# Patient Record
Sex: MALE | Race: OTHER | NOT HISPANIC OR LATINO | URBAN - METROPOLITAN AREA
[De-identification: names, ages, dates, MRNs, and addresses within clinical notes are randomized per-mention and may not be internally consistent; named-entity substitution may affect disease eponyms.]

---

## 2022-10-05 ENCOUNTER — INPATIENT (INPATIENT)
Facility: HOSPITAL | Age: 37
LOS: 11 days | Discharge: HOME | End: 2022-10-17
Attending: PSYCHIATRY & NEUROLOGY | Admitting: PSYCHIATRY & NEUROLOGY

## 2022-10-05 VITALS
RESPIRATION RATE: 17 BRPM | SYSTOLIC BLOOD PRESSURE: 121 MMHG | DIASTOLIC BLOOD PRESSURE: 61 MMHG | OXYGEN SATURATION: 100 % | HEART RATE: 64 BPM | TEMPERATURE: 98 F

## 2022-10-05 DIAGNOSIS — F29 UNSPECIFIED PSYCHOSIS NOT DUE TO A SUBSTANCE OR KNOWN PHYSIOLOGICAL CONDITION: ICD-10-CM

## 2022-10-05 LAB
ALBUMIN SERPL ELPH-MCNC: 4.6 G/DL — SIGNIFICANT CHANGE UP (ref 3.3–5)
ALP SERPL-CCNC: 54 U/L — SIGNIFICANT CHANGE UP (ref 40–120)
ALT FLD-CCNC: 20 U/L — SIGNIFICANT CHANGE UP (ref 4–41)
ANION GAP SERPL CALC-SCNC: 13 MMOL/L — SIGNIFICANT CHANGE UP (ref 7–14)
APAP SERPL-MCNC: <10 UG/ML — LOW (ref 15–25)
AST SERPL-CCNC: 30 U/L — SIGNIFICANT CHANGE UP (ref 4–40)
BASOPHILS # BLD AUTO: 0.06 K/UL — SIGNIFICANT CHANGE UP (ref 0–0.2)
BASOPHILS NFR BLD AUTO: 0.6 % — SIGNIFICANT CHANGE UP (ref 0–2)
BILIRUB SERPL-MCNC: 0.5 MG/DL — SIGNIFICANT CHANGE UP (ref 0.2–1.2)
BUN SERPL-MCNC: 15 MG/DL — SIGNIFICANT CHANGE UP (ref 7–23)
CALCIUM SERPL-MCNC: 9.4 MG/DL — SIGNIFICANT CHANGE UP (ref 8.4–10.5)
CHLORIDE SERPL-SCNC: 100 MMOL/L — SIGNIFICANT CHANGE UP (ref 98–107)
CK SERPL-CCNC: 350 U/L — HIGH (ref 30–200)
CO2 SERPL-SCNC: 27 MMOL/L — SIGNIFICANT CHANGE UP (ref 22–31)
CREAT SERPL-MCNC: 0.74 MG/DL — SIGNIFICANT CHANGE UP (ref 0.5–1.3)
EGFR: 121 ML/MIN/1.73M2 — SIGNIFICANT CHANGE UP
EOSINOPHIL # BLD AUTO: 0 K/UL — SIGNIFICANT CHANGE UP (ref 0–0.5)
EOSINOPHIL NFR BLD AUTO: 0 % — SIGNIFICANT CHANGE UP (ref 0–6)
ETHANOL SERPL-MCNC: <10 MG/DL — SIGNIFICANT CHANGE UP
FLUAV AG NPH QL: SIGNIFICANT CHANGE UP
FLUBV AG NPH QL: SIGNIFICANT CHANGE UP
GLUCOSE SERPL-MCNC: 95 MG/DL — SIGNIFICANT CHANGE UP (ref 70–99)
HCT VFR BLD CALC: 42 % — SIGNIFICANT CHANGE UP (ref 39–50)
HGB BLD-MCNC: 13.8 G/DL — SIGNIFICANT CHANGE UP (ref 13–17)
IANC: 6.77 K/UL — SIGNIFICANT CHANGE UP (ref 1.8–7.4)
IMM GRANULOCYTES NFR BLD AUTO: 0.4 % — SIGNIFICANT CHANGE UP (ref 0–0.9)
LYMPHOCYTES # BLD AUTO: 1.89 K/UL — SIGNIFICANT CHANGE UP (ref 1–3.3)
LYMPHOCYTES # BLD AUTO: 20.3 % — SIGNIFICANT CHANGE UP (ref 13–44)
MCHC RBC-ENTMCNC: 31.3 PG — SIGNIFICANT CHANGE UP (ref 27–34)
MCHC RBC-ENTMCNC: 32.9 GM/DL — SIGNIFICANT CHANGE UP (ref 32–36)
MCV RBC AUTO: 95.2 FL — SIGNIFICANT CHANGE UP (ref 80–100)
MONOCYTES # BLD AUTO: 0.55 K/UL — SIGNIFICANT CHANGE UP (ref 0–0.9)
MONOCYTES NFR BLD AUTO: 5.9 % — SIGNIFICANT CHANGE UP (ref 2–14)
NEUTROPHILS # BLD AUTO: 6.77 K/UL — SIGNIFICANT CHANGE UP (ref 1.8–7.4)
NEUTROPHILS NFR BLD AUTO: 72.8 % — SIGNIFICANT CHANGE UP (ref 43–77)
NRBC # BLD: 0 /100 WBCS — SIGNIFICANT CHANGE UP (ref 0–0)
NRBC # FLD: 0 K/UL — SIGNIFICANT CHANGE UP (ref 0–0)
PLATELET # BLD AUTO: 265 K/UL — SIGNIFICANT CHANGE UP (ref 150–400)
POTASSIUM SERPL-MCNC: 4.9 MMOL/L — SIGNIFICANT CHANGE UP (ref 3.5–5.3)
POTASSIUM SERPL-SCNC: 4.9 MMOL/L — SIGNIFICANT CHANGE UP (ref 3.5–5.3)
PROT SERPL-MCNC: 6.7 G/DL — SIGNIFICANT CHANGE UP (ref 6–8.3)
RBC # BLD: 4.41 M/UL — SIGNIFICANT CHANGE UP (ref 4.2–5.8)
RBC # FLD: 12.7 % — SIGNIFICANT CHANGE UP (ref 10.3–14.5)
RSV RNA NPH QL NAA+NON-PROBE: SIGNIFICANT CHANGE UP
SALICYLATES SERPL-MCNC: 0.9 MG/DL — LOW (ref 15–30)
SARS-COV-2 RNA SPEC QL NAA+PROBE: SIGNIFICANT CHANGE UP
SODIUM SERPL-SCNC: 140 MMOL/L — SIGNIFICANT CHANGE UP (ref 135–145)
TOXICOLOGY SCREEN, DRUGS OF ABUSE, SERUM RESULT: SIGNIFICANT CHANGE UP
TSH SERPL-MCNC: 1.97 UIU/ML — SIGNIFICANT CHANGE UP (ref 0.27–4.2)
WBC # BLD: 9.31 K/UL — SIGNIFICANT CHANGE UP (ref 3.8–10.5)
WBC # FLD AUTO: 9.31 K/UL — SIGNIFICANT CHANGE UP (ref 3.8–10.5)

## 2022-10-05 PROCEDURE — 70450 CT HEAD/BRAIN W/O DYE: CPT | Mod: 26,MA

## 2022-10-05 PROCEDURE — 93010 ELECTROCARDIOGRAM REPORT: CPT

## 2022-10-05 PROCEDURE — 99284 EMERGENCY DEPT VISIT MOD MDM: CPT

## 2022-10-05 RX ORDER — HALOPERIDOL DECANOATE 100 MG/ML
5 INJECTION INTRAMUSCULAR EVERY 6 HOURS
Refills: 0 | Status: DISCONTINUED | OUTPATIENT
Start: 2022-10-06 | End: 2022-10-06

## 2022-10-05 RX ORDER — RISPERIDONE 4 MG/1
0.5 TABLET ORAL
Refills: 0 | Status: DISCONTINUED | OUTPATIENT
Start: 2022-10-06 | End: 2022-10-06

## 2022-10-05 RX ORDER — HALOPERIDOL DECANOATE 100 MG/ML
5 INJECTION INTRAMUSCULAR ONCE
Refills: 0 | Status: DISCONTINUED | OUTPATIENT
Start: 2022-10-06 | End: 2022-10-06

## 2022-10-05 RX ORDER — HALOPERIDOL DECANOATE 100 MG/ML
5 INJECTION INTRAMUSCULAR ONCE
Refills: 0 | Status: COMPLETED | OUTPATIENT
Start: 2022-10-05 | End: 2022-10-05

## 2022-10-05 RX ADMIN — Medication 2 MILLIGRAM(S): at 20:50

## 2022-10-05 RX ADMIN — HALOPERIDOL DECANOATE 5 MILLIGRAM(S): 100 INJECTION INTRAMUSCULAR at 20:50

## 2022-10-05 NOTE — ED BEHAVIORAL HEALTH ASSESSMENT NOTE - NSBHATTESTAPPAMEND_PSY_A_CORE
I have personally seen and examined this patient. I fully participated in the care of this patient. I have made amendments to the documentation where appropriate and otherwise agree with the history, physical exam, and plan as documented by the JULISSA

## 2022-10-05 NOTE — ED BEHAVIORAL HEALTH ASSESSMENT NOTE - RISK ASSESSMENT
Low Acute Suicide Risk low risk for suicide- no prior suicide attempts, has supportive family, denies suicidal ideation     risk factors - psychosis, substance abuse, male

## 2022-10-05 NOTE — ED PROVIDER NOTE - OBJECTIVE STATEMENT
This is a 36 yr old M, no pertinent pmh with c/o ah, delusions, disorganized. Pt arrived with cousin Melany ( [phone]) This is a 36 yr old M, no pertinent pmh with c/o ah, delusions, disorganized. Pt arrived with cousin Melany ( [phone]), who reports he  started to act strangely on Thursday. He lives with his brother in [place] renting a room. His brother was dx with schizophrenia couple of years ago. Now he is starting to act out of his baseline. He disappeared for couple of days. Family was able to take him to [place] on Monday for an evaluation but was discharge on Tuesday morning, and disappeared once again. Finally family found him but the car he was driving, he does not know its whereabout. As per cousin, he is delusional, endorsed ah, and fixated on [facility]. She reports over the summer time, he visited his parents who live in [place]. During the time one of his friend passed away from heroin overdose. Family does not know if he is referring [facility] because of that even. She reports PT had a head concussion about 2 years ago, which was work related. Cousin endorse he used to smoke lots of marijuana, but does not know any illicit drugs, he is not a drinker.   Pt upon arrival, disorganized, disoriented, fixated on his arm pits and heart attack unable to give hx. Denies chest pain and discomfort, sob.

## 2022-10-05 NOTE — ED PROVIDER NOTE - PROGRESS NOTE DETAILS
NP Bereczky- pt became more disorganized fixated on hand , anxious, hard to redirect, medication offered and given, will reassess.

## 2022-10-05 NOTE — ED ADULT NURSE NOTE - OBJECTIVE STATEMENT
Pt received to  intake disorganized, flights of ideas and fixated on his armpit and heart attack.  Pt brought in by cousin who verbalized patient's AH. Per cousin, patient's brother was diagnosed with Schizophrenia recently. Patient unable to change own clothes, help rendered. Noted requesting for hand sanitizers numerous times. Pt washed hands numerous times. Not easily re-directable. safety maintained. Pt belongings checked and secured by staff.  Pt checked by metal detector.  Pt changed into gown and scrubs.  Pt awaiting Psych assessment.

## 2022-10-05 NOTE — ED BEHAVIORAL HEALTH ASSESSMENT NOTE - NSBHATTESTCOMMENTATTENDFT_PSY_A_CORE
Patient is a year 36 old, male; domicile with brother in [place]; single; noncaregiver; unemployed ; no formal psychiatric treatment; no known suicide attempts; no known history of violence or arrests; h/o cannabis abuse, no known history of complicated withdrawal; PMH unremarkable; brought in  by cousin for bizarre behavior.    Patient is difficult to evaluate properly; he is confused and psychotic . He is unaware of his surrounding and was not able to participate in interview. Upon arrival to ED patient was agitated in confused requiring Ativan 2 mg Haldol 5 mg for sedation. Patient was demanding a hand  "the new one" He was walking around, grabbing air and mumbling things, admitted to paranoia and .  Collateral reports for the past week patient has been acting bizarre and disappearing for days. He recently reported hearing voices and verbalized thoughts that people are out to get him and his family. These symptoms represent an acute change from baseline. It is unclear at this time if current presentation is substance induced vs primary psychotic disorder. Patient has severe impairment in judgment and insight and a danger to self, requiring inpatient psychiatric hospitalization for safety and stabilization once medically cleared. At this time there are no beds and patient will board in the ED overnight.

## 2022-10-05 NOTE — ED BEHAVIORAL HEALTH ASSESSMENT NOTE - DESCRIPTION
uncooperative and combative   ICU Vital Signs Last 24 Hrs  T(C): 36.7 (05 Oct 2022 20:11), Max: 36.7 (05 Oct 2022 20:11)  T(F): 98 (05 Oct 2022 20:11), Max: 98 (05 Oct 2022 20:11)  HR: 64 (05 Oct 2022 20:11) (64 - 64)  BP: 121/61 (05 Oct 2022 20:11) (121/61 - 121/61)  BP(mean): --  ABP: --  ABP(mean): --  RR: 17 (05 Oct 2022 20:11) (17 - 17)  SpO2: 100% (05 Oct 2022 20:11) (100% - 100%)    O2 Parameters below as of 05 Oct 2022 20:11  Patient On (Oxygen Delivery Method): room air see hpi none known

## 2022-10-05 NOTE — ED BEHAVIORAL HEALTH NOTE - BEHAVIORAL HEALTH NOTE
COVID Exposure Screen- collateral (i.e. third-party, chart review, belongings, etc; include EMS and ED staff)  1.	*Has the patient had a COVID-19 test in the last 90 days?  (  ) Yes   (  ) No   ( x ) Unknown- Reason: _____  IF YES PROCEED TO QUESTION #2. IF NO OR UNKNOWN, PLEASE SKIP TO QUESTION #3.  2.	Date of test(s) and result(s): ________  3.	*Has the patient tested positive for COVID-19 antibodies? (  ) Yes   (  ) No   (x ) Unknown- Reason: _____  IF YES PROCEED TO QUESTION #4. IF NO or UNKNOWN, PLEASE SKIP TO QUESTION #5.  4.	Date of positive antibody test: ________  5.	*Has the patient received 2 doses of the COVID-19 vaccine? (  ) Yes   (  ) No   ( x ) Unknown- Reason: _____  IF YES PROCEED TO QUESTION #6. IF NO or UNKNOWN, PLEASE SKIP TO QUESTION #7.  6.	 Date of second dose: ________  7.	*In the past 10 days, has the patient been around anyone with a positive COVID-19 test?* (  ) Yes   (  ) No   (x  ) Unknown- Reason: __  IF YES PROCEED TO QUESTION #8. IF NO or UNKNOWN, PLEASE SKIP TO QUESTION #13.  8.	Was the patient within 6 feet of them for at least 15 minutes? (  ) Yes   (  ) No   (  ) Unknown- Reason: _____  9.	Did the patient provide care for them? (  ) Yes   (  ) No   (  ) Unknown- Reason: ______  10.	Did the patient have direct physical contact with them (touched, hugged, or kissed them)? (  ) Yes   (  ) No    (  ) Unknown- Reason: __  11.	Did the patient share eating or drinking utensils with them? (  ) Yes   (  ) No    (  ) Unknown- Reason: ____  12.	Did they sneeze, cough, or somehow get respiratory droplets on the patient? (  ) Yes   (  ) No    (  ) Unknown- Reason: ______  13.	*Has the patient been out of [place] within the past 10 days?* (  ) Yes   (x  ) No   (  ) Unknown- Reason: _____  IF YES PLEASE ANSWER THE FOLLOWING QUESTIONS:  14.	Which state/country did they go to? ______  15.	Were they there over 24 hours? (  ) Yes   (  ) No    (  ) Unknown- Reason: ______  16.	Date of return to [facility]: ______

## 2022-10-05 NOTE — ED BEHAVIORAL HEALTH ASSESSMENT NOTE - DETAILS
denies suicidal ideation will be provided to clay brother- schizophrenia - first break at age 36. cousin Low 4 Rika Choe

## 2022-10-05 NOTE — ED BEHAVIORAL HEALTH ASSESSMENT NOTE - SUMMARY
Patient is a year 36 old, male; domicile with brother in [place]; single; noncaregiver; unemployed ; no formal psychiatric treatment; no known suicide attempts; no known history of violence or arrests; h/o cannabis abuse, no known history of complicated withdrawal; PMH unremarkable; brought in by EMS; called by cousin for bizarre behavior.    Patient presents psychotic with paranoid delusions and A/H. He is unaware of his surrounding and was not able to participate in interview. Upon arrival to ED patient was agitated in confused requiring Ativan 2 mg Haldol 5 mg for sedation. Collateral reports for the past week patient has been acting bizarre and disappearing for days. He recently reported hearing voices and verbalized thoughts that people are out to get him and his family. These symptoms represent an acute change from baseline. It is unclear at this time if current presentation is substance induced vs primary psychotic disorder. Patient has severe impairment in judgment and insight and a danger to self, requiring inpatient psychiatric hospitalization for safety and stabilization once medically cleared.   Recommend  Admit to Low 3  Start Risperdal 0.5mg bid  Haldol 5 mg Ativan 2 mg po/im q6h prn agitation  patient does on require constant observation. Patient is a year 36 old, male; domicile with brother in [place]; single; noncaregiver; unemployed ; no formal psychiatric treatment; no known suicide attempts; no known history of violence or arrests; h/o cannabis abuse, no known history of complicated withdrawal; PMH unremarkable; brought in by EMS; called by cousin for bizarre behavior.    Patient presents psychotic with paranoid delusions and A/H. He is unaware of his surrounding and was not able to participate in interview. Upon arrival to ED patient was agitated in confused requiring Ativan 2 mg Haldol 5 mg for sedation. Collateral reports for the past week patient has been acting bizarre and disappearing for days. He recently reported hearing voices and verbalized thoughts that people are out to get him and his family. These symptoms represent an acute change from baseline. It is unclear at this time if current presentation is substance induced vs primary psychotic disorder. Patient has severe impairment in judgment and insight and a danger to self, requiring inpatient psychiatric hospitalization for safety and stabilization once medically cleared.   Recommend  Admit to Low 3  Start Risperdal 0.5mg bid  Haldol 5 mg Ativan 2 mg po/im q6h prn agitation  patient requires constant observation - was wondering in and out of rooms in ED Patient is a year 36 old, male; domicile with brother in [place]; single; noncaregiver; unemployed ; no formal psychiatric treatment; no known suicide attempts; no known history of violence or arrests; h/o cannabis abuse, no known history of complicated withdrawal; PMH unremarkable; brought in by EMS; called by cousin for bizarre behavior.    Patient presents psychotic with paranoid delusions and A/H. He is unaware of his surrounding and was not able to participate in interview. Upon arrival to ED patient was agitated in confused requiring Ativan 2 mg Haldol 5 mg for sedation. Collateral reports for the past week patient has been acting bizarre and disappearing for days. He recently reported hearing voices and verbalized thoughts that people are out to get him and his family. These symptoms represent an acute change from baseline. It is unclear at this time if current presentation is substance induced vs primary psychotic disorder. Patient has severe impairment in judgment and insight and a danger to self, requiring inpatient psychiatric hospitalization for safety and stabilization once medically cleared. At this time there are no beds and patient will board in the ED overnight.   Recommend  Start Risperdal 0.5mg bid  Haldol 5 mg Ativan 2 mg po/im q6h prn agitation  patient requires constant observation on inpatient unit - was wondering in and out of rooms in ED Patient is a year 36 old, male; domicile with brother in [place]; single; noncaregiver; unemployed ; no formal psychiatric treatment; no known suicide attempts; no known history of violence or arrests; h/o cannabis abuse, no known history of complicated withdrawal; PMH unremarkable; brought in  by cousin for bizarre behavior.    Patient presents psychotic with paranoid delusions and A/H. He is unaware of his surrounding and was not able to participate in interview. Upon arrival to ED patient was agitated in confused requiring Ativan 2 mg Haldol 5 mg for sedation. Collateral reports for the past week patient has been acting bizarre and disappearing for days. He recently reported hearing voices and verbalized thoughts that people are out to get him and his family. These symptoms represent an acute change from baseline. It is unclear at this time if current presentation is substance induced vs primary psychotic disorder. Patient has severe impairment in judgment and insight and a danger to self, requiring inpatient psychiatric hospitalization for safety and stabilization once medically cleared. At this time there are no beds and patient will board in the ED overnight.   Recommend  Start Risperdal 0.5mg bid  Haldol 5 mg Ativan 2 mg po/im q6h prn agitation  patient requires constant observation on inpatient unit - was wondering in and out of rooms in ED

## 2022-10-05 NOTE — ED PROVIDER NOTE - CLINICAL SUMMARY MEDICAL DECISION MAKING FREE TEXT BOX
This is a 36 yr old M, no pertinent pmh with c/o ah, delusions, disorganized. Pt arrived with cousin Melany ( [phone]), who reports he  started to act strangely on Thursday. He lives with his brother in [place] renting a room. His brother was dx with schizophrenia couple of years ago. Now he is starting to act out of his baseline. He disappeared for couple of days. Family was able to take him to [place] on Monday for an evaluation but was discharge on Tuesday morning, and disappeared once again. Finally family found him but the car he was driving, he does not know its whereabout. As per cousin, he is delusional, endorsed ah, and fixated on [facility]. She reports over the summer time, he visited his parents who live in [place]. During the time one of his friend passed away from heroin overdose. Family does not know if he is referring [facility] because of that even. She reports PT had a head concussion about 2 years ago, which was work related. Cousin endorse he used to smoke lots of marijuana, but does not know any illicit drugs, he is not a drinker.   Pt upon arrival, disorganized, disoriented, fixated on his arm pits and heart attack unable to give hx. Denies chest pain and discomfort, sob.  Labs r/o electrolyte imbalances metabolic causes, infection, ekg, ct head- r/o psychosis vs organic causes  psych consult requested-

## 2022-10-05 NOTE — ED BEHAVIORAL HEALTH ASSESSMENT NOTE - HPI (INCLUDE ILLNESS QUALITY, SEVERITY, DURATION, TIMING, CONTEXT, MODIFYING FACTORS, ASSOCIATED SIGNS AND SYMPTOMS)
Patient is a year 36 old, male; domicile with brother in [place]; single; noncaregiver; unemployed ; no formal psychiatric treatment; no known suicide attempts; no known history of violence or arrests; h/o cannabis abuse, no known history of complicated withdrawal; PMH unremarkable; brought in by EMS; called by cousin for bizarre behavior.    Patient is a poor historian and is not able to provide HPI. Upon arrival to ED patient was agitated and uncooperative. He did not respond to verbal de-escalation and received Ativan 2 mg and Haldol 5 mg. Patient was disoriented to place, time and situation. When asked why he was brought to the ED patient stated, " I had chest pain and you brought me here." Patient was unsteady on his feet and banging into walls. His eyes were half closed and his speech was low and mumbled. He denied using illicite substances or alcohol.     Received collateral from andrea Mosley who activated EMS. Per Melany patient has been acting bizarre for over a week. Last week patient disappeared for several days . Cousin received a text  message from patient's friend that patient was paranoid and having A/H. On 10/2/22 Melany spoke with patient and he admitted to feeling paranoid and A/H. Patient threw out his phone because he thought people were tracking him. Patient was afraid he was going to intermediate and was worried that his love ones were going to be harmed. that day his brother allowed him to take his car and patient disappeared for 24 hours. When he returned on Monday he claimed that he had lost the car but still had the keys in his possession. [place] activated EMS and patient was taken to [facility] and discharged. After discharge patient walked the streets and returned home this evening. Patient appeared disheveled and was walking around naked. He was disorganized and paranoid and appeared to be experiencing visual and auditory hallucinations.   At baseline patient is calm, cooperative and has few select friends. He is independent with advanced ADL's and works PT in [employer].  Patient has a h/o abusing Cannabis and possible karla. He has no known legal issues and has never been violent or aggressive. Patient brother, who is several years older was diagnosed with schizophrenia at age 35. Patient is a year 36 old, male; domicile with brother in [place]; single; noncaregiver; unemployed ; no formal psychiatric treatment; no known suicide attempts; no known history of violence or arrests; h/o cannabis abuse, no known history of complicated withdrawal; PMH unremarkable; brought in by cousin for bizarre behavior.    Patient is a poor historian and is not able to provide HPI. Upon arrival to ED patient was agitated and uncooperative. He did not respond to verbal de-escalation and received Ativan 2 mg and Haldol 5 mg. Patient was disoriented to place, time and situation. When asked why he was brought to the ED patient stated, " I had chest pain and you brought me here." Patient was unsteady on his feet and banging into walls. His eyes were half closed and his speech was low and mumbled. He denied using illicite substances or alcohol.     Received collateral from cousin Melany who activated EMS. Per Melany patient has been acting bizarre for over a week. Last week patient disappeared for several days . Cousin received a text  message from patient's friend that patient was paranoid and having A/H. On 10/2/22 Melany spoke with patient and he admitted to feeling paranoid and A/H. Patient threw out his phone because he thought people were tracking him. Patient was afraid he was going to assisted and was worried that his love ones were going to be harmed. that day his brother allowed him to take his car and patient disappeared for 24 hours. When he returned on Monday he claimed that he had lost the car but still had the keys in his possession. Cousin activated EMS and patient was taken to [facility] and discharged. After discharge patient walked the streets and returned home this evening. Patient appeared disheveled and was walking around naked. He was disorganized and paranoid and appeared to be experiencing visual and auditory hallucinations.   At baseline patient is calm, cooperative and has few select friends. He is independent with advanced ADL's and works PT in [employer].  Patient has a h/o abusing Cannabis and possible karla. He has no known legal issues and has never been violent or aggressive. Patient brother, who is several years older was diagnosed with schizophrenia at age 35.

## 2022-10-05 NOTE — ED ADULT NURSE REASSESSMENT NOTE - NS ED NURSE REASSESS COMMENT FT1
Pt noted totally disorganized, non-redirectable despite education and encouragement. Noted in increased aggression towards staff and refusing labs. Haldol 5mg and Ativan 2mg administered at 20:50 with assist x 4. Will continue to redirect and monitor.

## 2022-10-05 NOTE — ED BEHAVIORAL HEALTH ASSESSMENT NOTE - NSBHATTESTTYPEVISIT_PSY_A_CORE
On-site Attending with Resident/Fellow/Student and JULISSA (99XXX codes) Attending evaluating patient with JULISSA ([id number] code)

## 2022-10-05 NOTE — ED ADULT TRIAGE NOTE - CHIEF COMPLAINT QUOTE
Pt brought in by cousin for disorganized thoughts, auditory hallucinations, wondering, and severe anxiety. Pt admits to drug use in the past. Pt denies si/hi

## 2022-10-06 DIAGNOSIS — F33.9 MAJOR DEPRESSIVE DISORDER, RECURRENT, UNSPECIFIED: ICD-10-CM

## 2022-10-06 LAB
COVID-19 SPIKE DOMAIN AB INTERP: POSITIVE
COVID-19 SPIKE DOMAIN ANTIBODY RESULT: >250 U/ML — HIGH
SARS-COV-2 IGG+IGM SERPL QL IA: >250 U/ML — HIGH
SARS-COV-2 IGG+IGM SERPL QL IA: POSITIVE

## 2022-10-06 PROCEDURE — 99222 1ST HOSP IP/OBS MODERATE 55: CPT

## 2022-10-06 RX ORDER — OLANZAPINE 15 MG/1
10 TABLET, FILM COATED ORAL AT BEDTIME
Refills: 0 | Status: DISCONTINUED | OUTPATIENT
Start: 2022-10-06 | End: 2022-10-17

## 2022-10-06 RX ORDER — OLANZAPINE 15 MG/1
7.5 TABLET, FILM COATED ORAL EVERY 4 HOURS
Refills: 0 | Status: DISCONTINUED | OUTPATIENT
Start: 2022-10-06 | End: 2022-10-17

## 2022-10-06 RX ORDER — OLANZAPINE 15 MG/1
10 TABLET, FILM COATED ORAL ONCE
Refills: 0 | Status: DISCONTINUED | OUTPATIENT
Start: 2022-10-06 | End: 2022-10-17

## 2022-10-06 RX ORDER — HALOPERIDOL DECANOATE 100 MG/ML
5 INJECTION INTRAMUSCULAR ONCE
Refills: 0 | Status: COMPLETED | OUTPATIENT
Start: 2022-10-06 | End: 2022-10-06

## 2022-10-06 RX ORDER — CLONAZEPAM 1 MG
1 TABLET ORAL AT BEDTIME
Refills: 0 | Status: DISCONTINUED | OUTPATIENT
Start: 2022-10-06 | End: 2022-10-10

## 2022-10-06 RX ADMIN — Medication 1 MILLIGRAM(S): at 20:34

## 2022-10-06 RX ADMIN — Medication 2 MILLIGRAM(S): at 20:34

## 2022-10-06 RX ADMIN — OLANZAPINE 10 MILLIGRAM(S): 15 TABLET, FILM COATED ORAL at 20:34

## 2022-10-06 RX ADMIN — Medication 2 MILLIGRAM(S): at 11:00

## 2022-10-06 RX ADMIN — HALOPERIDOL DECANOATE 5 MILLIGRAM(S): 100 INJECTION INTRAMUSCULAR at 11:00

## 2022-10-06 NOTE — BH INPATIENT PSYCHIATRY ASSESSMENT NOTE - CURRENT MEDICATION
MEDICATIONS  (STANDING):  clonazePAM  Tablet 1 milliGRAM(s) Oral at bedtime  OLANZapine Disintegrating Tablet 10 milliGRAM(s) Oral at bedtime    MEDICATIONS  (PRN):  LORazepam     Tablet 2 milliGRAM(s) Oral every 4 hours PRN agitation  LORazepam   Injectable 2 milliGRAM(s) IntraMuscular Once PRN Agitation  OLANZapine 7.5 milliGRAM(s) Oral every 4 hours PRN agitation  OLANZapine Injectable 10 milliGRAM(s) IntraMuscular once PRN severe agitation

## 2022-10-06 NOTE — BH CONSULTATION LIAISON PROGRESS NOTE - MSE UNSTRUCTURED FT
Mental Status Exam:  · General Appearance	No deformities present  · Body Habitus	Average build  · Hygiene	Poor  · Grooming	Poor  · Behavior	Uncooperative  · Eye Contact	Poor  · Relatedness	Poor  · Impulse Control	Impaired by hx  · Muscle Tone / Strength	Normal muscle tone/strength  · Abnormal Movements	No abnormal movements  · Gait / Station	unable to assess, patient sedated and sleeping   · Speech	unable to assess, patient sedated and sleeping   · Reported mood	unable to assess, patient sedated and sleeping   · Affect Quality	unable to assess, patient sedated and sleeping   · Affect Range	unable to assess, patient sedated and sleeping   · Affect Congruence	unable to assess, patient sedated and sleeping   · Thought Process	unable to assess, patient sedated and sleeping   · Thought Associations	unable to assess, patient sedated and sleeping   · Thought Content	unable to assess, patient sedated and sleeping   · Perceptions	unable to assess, patient sedated and sleeping   · Orientation	unable to assess, patient sedated and sleeping   · Attention / Concentration	Impaired  · Estimated Intelligence	unable to assess, patient sedated and sleeping   · Recent Memory	unable to assess, patient sedated and sleeping   · Remote Memory	unable to assess, patient sedated and sleeping   · Fund of Knowledge	unable to assess, patient sedated and sleeping   · Language	unable to assess, patient sedated and sleeping   · Judgment (regarding everyday events)	Poor by hx  · Insight (regarding psychiatric illness)	Poor by history

## 2022-10-06 NOTE — BH INPATIENT PSYCHIATRY ASSESSMENT NOTE - HPI (INCLUDE ILLNESS QUALITY, SEVERITY, DURATION, TIMING, CONTEXT, MODIFYING FACTORS, ASSOCIATED SIGNS AND SYMPTOMS)
37 yo Male with no prior formal past history of psychiatric illness but with a brother 5 years older who suffers from schizophrenia  Patient with one week of psychosis including paranoid delusions that he is being followed  He then threw out his phone and ran away as he thought that he how he was being followed  Also reported to family that he was experiencing AH  Was seen in [place] ER and discharged  'Then was missing for 24 hours and borrowed brother's car and "lost it"  Was aggressive and agitated in ER requiring IM meds and restraints  On exam today after arrival is very sedated with difficulty answering questions  Admits to both his paranoid thoughts as well as AH  Denies recent substance use and command AH but unclear if this is truly reliable answers  Spoke with patient's cousin for additional info as patient is too sedated to provide HIPPA release  Patient's cousin reports that patient's brother had a robust response to the SGA olanzapine  No known Medical history  No hx of allergies to meds    AS PER ER EVALUATION:  "Patient is a year 36 old, male; domicile with brother in [place]; single; noncaregiver; unemployed ; no formal psychiatric treatment; no known suicide attempts; no known history of violence or arrests; h/o cannabis abuse, no known history of complicated withdrawal; PMH unremarkable; brought in by cousin for bizarre behavior.    Patient is a poor historian and is not able to provide HPI. Upon arrival to ED patient was agitated and uncooperative. He did not respond to verbal de-escalation and received Ativan 2 mg and Haldol 5 mg. Patient was disoriented to place, time and situation. When asked why he was brought to the ED patient stated, " I had chest pain and you brought me here." Patient was unsteady on his feet and banging into walls. His eyes were half closed and his speech was low and mumbled. He denied using illicite substances or alcohol.     Received collateral from cousin Melany who activated EMS. Per Melany patient has been acting bizarre for over a week. Last week patient disappeared for several days . Cousin received a text  message from patient's friend that patient was paranoid and having A/H. On 10/2/22 Melany spoke with patient and he admitted to feeling paranoid and A/H. Patient threw out his phone because he thought people were tracking him. Patient was afraid he was going to halfway and was worried that his love ones were going to be harmed. that day his brother allowed him to take his car and patient disappeared for 24 hours. When he returned on Monday he claimed that he had lost the car but still had the keys in his possession. Cousin activated EMS and patient was taken to [facility] and discharged. After discharge patient walked the streets and returned home this evening. Patient appeared disheveled and was walking around naked. He was disorganized and paranoid and appeared to be experiencing visual and auditory hallucinations.   At baseline patient is calm, cooperative and has few select friends. 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  Patient has a h/o abusing Cannabis and possible karla. He has no known legal issues and has never been violent or aggressive. Patient brother, who is several years older was diagnosed with schizophrenia at age 35.

## 2022-10-06 NOTE — BH CONSULTATION LIAISON PROGRESS NOTE - NSBHCONSULTPSYCHPLAN_PSY_A_CORE FT
Start Risperdal 0.5mg bid  Haldol 5 mg Ativan 2 mg po/im q6h prn agitation  patient requires constant observation on inpatient uni

## 2022-10-06 NOTE — BH INPATIENT PSYCHIATRY ASSESSMENT NOTE - RISK ASSESSMENT
low risk for suicide- no prior suicide attempts, has supportive family, denies suicidal ideation     risk factors - psychosis, substance abuse, male

## 2022-10-06 NOTE — ED ADULT NURSE REASSESSMENT NOTE - NS ED NURSE REASSESS COMMENT FT1
At around 1045am pt came out of room 5 with mask covering his eyes , walking into other pts and objects blindly, staff attempted to redirect pt back in room an remove face mask from his face, pt got aggressive combative swinging at staff had to be place in 4 point and given IM meds.

## 2022-10-06 NOTE — BH INPATIENT PSYCHIATRY ASSESSMENT NOTE - MSE UNSTRUCTURED FT
On exam today the patient is overly sedated and uncooperative with questions.    Speech is minimal.  Thought process: cannot be assessed     Thought content: cannot be assessed but admitted to fears of being followed.  Affect: Flat.    Perception: Endorsed AH in ER and on exam and denies command AH but reliability of answers are in question  Patient but unable to answer questions about Suicide, homicide, perceptual disturbances, Mood, or Memory  Patient is Alert and oriented to self  Insight and judgment are impaired. Impulse control is tenuous at this time

## 2022-10-06 NOTE — BH CONSULTATION LIAISON PROGRESS NOTE - NSBHASSESSMENTFT_PSY_ALL_CORE
Patient is a year 36 old, male; domicile with brother in [place]; single; noncaregiver; unemployed ; no formal psychiatric treatment; no known suicide attempts; no known history of violence or arrests; h/o cannabis abuse, no known history of complicated withdrawal; PMH unremarkable; brought in  by cousin for bizarre behavior.    Patient presented psychotic with paranoid delusions and A/H.  Upon arrival to ED patient was agitated in confused requiring Ativan 2 mg Haldol 5 mg for sedation. Collateral reports for the past week patient has been acting bizarre and disappearing for days. He recently reported hearing voices and verbalized thoughts that people are out to get him and his family. These symptoms represent an acute change from baseline. It is unclear at this time if current presentation is substance induced vs primary psychotic disorder. Patient has severe impairment in judgment and insight and a danger to self, requiring inpatient psychiatric hospitalization for safety and stabilization.

## 2022-10-06 NOTE — BH CONSULTATION LIAISON PROGRESS NOTE - NSBHFUPINTERVALHXFT_PSY_A_CORE
Patient is a year 36 old, male; domicile with brother in [place]; single; noncaregiver; unemployed ; no formal psychiatric treatment; no known suicide attempts; no known history of violence or arrests; h/o cannabis abuse, no known history of complicated withdrawal; PMH unremarkable; brought in by cousin for bizarre behavior.    Attempted to interview patient this AM. Patient sleeping. Resting comfortably, respirations even and non labored. He appears sedated from previous medication administration.  Patient is a year 36 old, male; domicile with brother in [place]; single; noncaregiver; unemployed ; no formal psychiatric treatment; no known suicide attempts; no known history of violence or arrests; h/o cannabis abuse, no known history of complicated withdrawal; PMH unremarkable; brought in by cousin for bizarre behavior.    Attempted to interview patient this AM. Patient sleeping. Resting comfortably, respirations even and non labored. He appears sedated from previous medication administration.     11:00AM- Patient reportedly came out of room with mask over eyes. Security asked patient to remove and put on mouth for safety reasons. Patient became agitated- attempted to assault staff. Required IM Haldol 5mg/Ativan 2mg at 4 point restraint 11:04AM

## 2022-10-06 NOTE — BH INPATIENT PSYCHIATRY ASSESSMENT NOTE - NSBHCHARTREVIEWVS_PSY_A_CORE FT
Vital Signs Last 24 Hrs  T(C): 36.7 (10-06-22 @ 12:31), Max: 36.8 (10-06-22 @ 09:02)  T(F): 98.1 (10-06-22 @ 12:31), Max: 98.3 (10-06-22 @ 09:02)  HR: 75 (10-06-22 @ 12:31) (57 - 80)  BP: 100/69 (10-06-22 @ 12:31) (100/60 - 121/61)  BP(mean): --  RR: 18 (10-06-22 @ 12:31) (15 - 18)  SpO2: 100% (10-06-22 @ 12:31) (100% - 100%)

## 2022-10-06 NOTE — BH CONSULTATION LIAISON PROGRESS NOTE - NSBHATTESTCOMMENTATTENDFT_PSY_A_CORE
Patient is a year 36 old, male; domicile with brother in [place]; single; noncaregiver; unemployed ; no formal psychiatric treatment; no known suicide attempts; no known history of violence or arrests; h/o cannabis abuse, no known history of complicated withdrawal; PMH unremarkable; brought in by cousin for bizarre behavior.    Patient currently calm and in behavioral control with administration of PRN medication.  Patient is an acute danger to self and others at this time.  Patient lacks insight and judgment into illness and remains an acute safety risk and warrants inpatient psychiatric hospitalization for safety and stabilization.

## 2022-10-06 NOTE — BH CONSULTATION LIAISON PROGRESS NOTE - NSBHCHARTREVIEWVS_PSY_A_CORE FT
Vital Signs Last 24 Hrs  T(C): 36.8 (06 Oct 2022 09:02), Max: 36.8 (06 Oct 2022 09:02)  T(F): 98.3 (06 Oct 2022 09:02), Max: 98.3 (06 Oct 2022 09:02)  HR: 71 (06 Oct 2022 09:02) (57 - 71)  BP: 113/62 (06 Oct 2022 09:02) (100/60 - 121/61)  BP(mean): --  RR: 16 (06 Oct 2022 09:02) (16 - 17)  SpO2: 100% (06 Oct 2022 09:02) (100% - 100%)    Parameters below as of 06 Oct 2022 09:02  Patient On (Oxygen Delivery Method): room air

## 2022-10-06 NOTE — BH INPATIENT PSYCHIATRY ASSESSMENT NOTE - NSBHASSESSSUMMFT_PSY_ALL_CORE
Patient is a year 36 old, male; domicile with brother in [place]; single; noncaregiver; unemployed ; no formal psychiatric treatment; no known suicide attempts; no known history of violence or arrests; h/o cannabis abuse, no known history of complicated withdrawal; PMH unremarkable; brought in by cousin for bizarre behavior.    On exam the patient is extremely sedated from IM medications in ER  Able to endorse both paranoid delusions and AH  Denies substance use and with negative Tox and CT scan  With history of brother with schizophrenia who also broke at age 35 schizophreniform disorder is high on the differential  Need to assess for Mood symptoms when better able to cooperate    Diagnosis   Unspecified Psychosis  R/O schizophreniform Disorder  R/O Brief Psychotic Reaction    PLAN:  Patient requires acute care  Admitted on a 939 emergency status  Patient requires CO at this time for severe agitation and disorganization  Based on severity of symptoms including aggression and history of response to olanzapine in brother will start SGA trial  Will start Olanzapine 10 mg HS (choice over aripiprazole and risperidone based on family history of response)  Will start clonazepam 1 mg BID standing for anxiety and agitation  Will utilize olanzapine as PRN as well and increase ceiling to 40 mg per 24 hours

## 2022-10-06 NOTE — BH PATIENT PROFILE - FALL HARM RISK - UNIVERSAL INTERVENTIONS
Bed in lowest position, wheels locked, appropriate side rails in place/Call bell, personal items and telephone in reach/Instruct patient to call for assistance before getting out of bed or chair/Non-slip footwear when patient is out of bed/[place] to call system/Physically safe environment - no spills, clutter or unnecessary equipment/Purposeful Proactive Rounding/Room/bathroom lighting operational, light cord in reach

## 2022-10-06 NOTE — ED ADULT NURSE REASSESSMENT NOTE - NS ED NURSE REASSESS COMMENT FT1
Pt sleeping in  room.  Respirations even and unlabored.  [facility] bed pending. Will continue to monitor.

## 2022-10-07 LAB
A1C WITH ESTIMATED AVERAGE GLUCOSE RESULT: 5.1 % — SIGNIFICANT CHANGE UP (ref 4–5.6)
CHOLEST SERPL-MCNC: 122 MG/DL — SIGNIFICANT CHANGE UP
ESTIMATED AVERAGE GLUCOSE: 100 — SIGNIFICANT CHANGE UP
HDLC SERPL-MCNC: 57 MG/DL — SIGNIFICANT CHANGE UP
LIPID PNL WITH DIRECT LDL SERPL: 51 MG/DL — SIGNIFICANT CHANGE UP
NON HDL CHOLESTEROL: 65 MG/DL — SIGNIFICANT CHANGE UP
TRIGL SERPL-MCNC: 69 MG/DL — SIGNIFICANT CHANGE UP

## 2022-10-07 PROCEDURE — 99232 SBSQ HOSP IP/OBS MODERATE 35: CPT

## 2022-10-07 RX ORDER — NICOTINE POLACRILEX 2 MG
1 GUM BUCCAL DAILY
Refills: 0 | Status: DISCONTINUED | OUTPATIENT
Start: 2022-10-07 | End: 2022-10-17

## 2022-10-07 RX ORDER — NICOTINE POLACRILEX 2 MG
4 GUM BUCCAL
Refills: 0 | Status: DISCONTINUED | OUTPATIENT
Start: 2022-10-07 | End: 2022-10-17

## 2022-10-07 RX ORDER — NICOTINE POLACRILEX 2 MG
1 GUM BUCCAL DAILY
Refills: 0 | Status: DISCONTINUED | OUTPATIENT
Start: 2022-10-07 | End: 2022-10-07

## 2022-10-07 RX ADMIN — Medication 1 PATCH: at 12:29

## 2022-10-07 RX ADMIN — OLANZAPINE 10 MILLIGRAM(S): 15 TABLET, FILM COATED ORAL at 20:33

## 2022-10-07 RX ADMIN — Medication 4 MILLIGRAM(S): at 12:29

## 2022-10-07 RX ADMIN — Medication 1 MILLIGRAM(S): at 20:33

## 2022-10-07 NOTE — DIETITIAN INITIAL EVALUATION ADULT - PERTINENT LABORATORY DATA
10-05    140  |  100  |  15  ----------------------------<  95  4.9   |  27  |  0.74    Ca    9.4      05 Oct 2022 21:50    TPro  6.7  /  Alb  4.6  /  TBili  0.5  /  DBili  x   /  AST  30  /  ALT  20  /  AlkPhos  54  10-05  A1C with Estimated Average Glucose Result: 5.1 % (10-07-22 @ 09:15)    Lipid Panel: Date/Time: 10-07-22 @ 09:15  Cholesterol, Serum: 122  Direct LDL: --  HDL Cholesterol, Serum: 57  Total Cholesterol/HDL Ration Measurement: --  Triglycerides, Serum: 69

## 2022-10-07 NOTE — DIETITIAN INITIAL EVALUATION ADULT - ORAL INTAKE PTA/DIET HISTORY
Patient reports decreased appetite and PO intake over the "past few days" PTA. Endorses he used to have a lot of fast food intake but recently he has been trying to eat healthier with more fruits and vegetables and avoid fatty meats. NKFA reported. No dietary restriction per patient.

## 2022-10-07 NOTE — BH INPATIENT PSYCHIATRY PROGRESS NOTE - NSBHFUPINTERVALHXFT_PSY_A_CORE
Patient seen for follow up for psychosis, chart, labs, EKG, imaging reviewed, case discussed with treatment team  No acute events overnight after patient came from ED. Was maintained on CO overnight for aggression given hx from ED, but this am no longer requiring and in good behavioral control. Patient mostly vague on exam today, seemingly attributable to difficulty recalling some events that preceded admit, though residual effects of bzds and APs given in ED and when pt arrived yesterday on unit are contributing. 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 Patient denying depressed mood, anxiety, SI/HI, AH/VH, no clear delusions elicited from patient.   Writer spoke with patient's cousin Lexx and father today, who reported that patient was notably off baseline for 1-2 weeks with waxing and waning sx, and rapid shifts from normal behavior to responding to internal stimuli, wandering, disorientation. Patient has made a number of statements to family about various drugs though they were unclear if he was actually using them. Furthermore, patient was expressing tremendous guilt to family about something he had done, but never actually told family what this was. Family is unsure if he is delusional about something or if some event may have actually transpired. Patient reportedly told them that "you will find out on October 25th."

## 2022-10-07 NOTE — DIETITIAN INITIAL EVALUATION ADULT - ADD RECOMMEND
1. Continue current diet order, which remains appropriate at this time. 2. Monitor PO intake/tolerance, weights, labs, BM's, and skin integrity. 3. Encourage po intake as tolerated and honor food preferences PRN.

## 2022-10-07 NOTE — DIETITIAN INITIAL EVALUATION ADULT - OTHER INFO
Patient is a year 36 old male with PMH unremarkable; brought in by cousin for bizarre behavior, paranoia, suspected delusions of guilt, AH/VH, disorientation and wandering.    Patient reports current appetite has improved with good PO intake at breakfast and lunch meals today, tolerating diet well, denies chewing/swallowing difficulties at meals. Patient denies GI distress (nausea/vomiting/diarrhea/constipation) at this time. Food preferences taken and implemented. Patient declined oral nutrition supplement offered. Writer encouraged po intake as tolerated, patient verbalized understanding. Patient has no questions about his diet at this time.     No Ht/Wt record in chart at this time. Patient reports his height is 6'2", but is uncertain about his recent wt/UBW due to he does not take his weight at home. Patient denies significant wt loss/gain PTA, states his weight "is about the same".

## 2022-10-07 NOTE — BH INPATIENT PSYCHIATRY PROGRESS NOTE - NSBHASSESSSUMMFT_PSY_ALL_CORE
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 Some mention of DMT to family, unclear if was actually using, but if was certainly may have cause or exacerbated symptoms approximating psychosis, though relative short duration of action is not consistent with story unless w frequent use.  With history of brother with schizophrenia who also broke at age 35 schizophreniform disorder is high on the differential      Diagnosis   Unspecified Psychosis  R/O schizophreniform Disorder  R/O Brief Psychotic Reaction  R/O substance induced psychotic disorder    PLAN:  Patient requires acute care  Admitted on a 939 emergency status  Safety: dc co, patient appearing in good behavioral control at this time, seems reality oriented, not expected to pose risk to self or others in controlled inpatient setting at this time.   -Psychosis:   	Based on severity of symptoms including aggression and history of response to olanzapine in brother will start SGA trial  	c/w Olanzapine 10 mg HS (choice over aripiprazole and risperidone based on family history of response)  	c/w clonazepam 1 mg HS for anxiety/insomnia, will plan to taper prior to dc as patient does not want to take habit forming medications  -PRNS: OLZ 10 IM PRNs to be avoided within 1 hr of IM ativan, however given severity of agitation in ED will continue both to ensure adequate options to manage 	agitation and safety of patient and others. Ativan 2mg IM/PO, Olanzapine 7.5mg PO Q6h PRN  -Substance: only admitting to regular cannabis use, will f/u further w patient.   	Smokes 1 PPD sometimes more, ordered for 21mg Nicotine Patch and 4mg Gum q2h PRN  -Medical: EKG showing Incomplete RBBB, likely non-significant, patient denies CP, SOB, palpitations, but will recheck EKG next week to determine stability of 	incomplete block on olanzapine. Discussed risk benefit of meds with patient including but not limited to NMS, TD, orthostasis, metabolic abnormalities, 	HLD, and uncertain but likely slightly elevated risk of cardiac AE related to incomplete RBBB (and all APs), patient amenable to tx.  Will repeat EKG next 	week, attempt repeat utox if patient amenable, consider send out for K2/Spice.   -Dispo / Collateral: Discussed hx and tx with cousin Robertopaco (who is psych RN) and parents (on 10/7) in agreement with plan for olz trial

## 2022-10-07 NOTE — DIETITIAN INITIAL EVALUATION ADULT - PERTINENT MEDS FT
MEDICATIONS  (STANDING):  clonazePAM  Tablet 1 milliGRAM(s) Oral at bedtime  nicotine - 21 mG/24Hr(s) Patch 1 Patch Transdermal daily  OLANZapine Disintegrating Tablet 10 milliGRAM(s) Oral at bedtime    MEDICATIONS  (PRN):  LORazepam     Tablet 2 milliGRAM(s) Oral every 4 hours PRN agitation  LORazepam   Injectable 2 milliGRAM(s) IntraMuscular Once PRN Agitation  nicotine  Polacrilex Gum 4 milliGRAM(s) Oral every 2 hours PRN cravings  OLANZapine 7.5 milliGRAM(s) Oral every 4 hours PRN agitation  OLANZapine Injectable 10 milliGRAM(s) IntraMuscular once PRN severe agitation

## 2022-10-07 NOTE — PSYCHIATRIC REHAB INITIAL EVALUATION - NSBHPRRECOMMEND_PSY_ALL_CORE
Patient is a 36 year old male, single, domiciled with his brother in [place], employed part time as a , with on prior hospitalization, with no known hx of arrests, violence, suicide attempts, or self-injurious behavior. Patient has a hx of cannabis use. Patient was brought to the hospital by his cousin due to bizarre behavior and change in mental status. Patient has been increasingly paranoid, and reportedly threw his phone away so that he could not be traced. Patient disappeared for several days. Patient has been walking around his him without clothing, knocking on doors. Patient is suspected of experiencing hallucinations.   Patient is disorganized and is not oriented to place. Patient required PRNs in the ED due to the fact that patient was uncooperative and combative.   Currently, patient has difficulty engaging in meaningful dialogue due to disorganization. Patient and psychiatric rehabilitation staff were unable to establish a collaborative rehabilitation goal, therefore an appropriate goal will be selected for the patient. Psychiatric rehabilitation staff will continue to provide ongoing support and encouragement.

## 2022-10-07 NOTE — BH INPATIENT PSYCHIATRY PROGRESS NOTE - MSE UNSTRUCTURED FT
On exam, patient is unkempt, with fair hygiene. Fair eye contact, cooperative with interview.  Speech:  normal productivity, volume, rate, fluency.    Normal gait/station.   Motor: no PMA/PMR evident.   Mood is "ok"  Affect: blunted/flat, mood congruent.   Thought process: patient mostly linear, goal directed, vague re recent events/behaviors   Thought content; no delusions elicited, per hx some delusions of guilt?, denies IOR, denies SI/HI/VI, denies thoughts to self-injure.   Perceptual; denies AH/CAH/VH, no other sensory disturbances elicited. Though AH/VH per hx unclear if these in context of acute intox, family suggested he mentioned DMT   Cognition: AAOx3  Attention/concentration: distractible   Insight: limited  Judgement: impaired   Impulse control:  intact at this time.

## 2022-10-08 PROCEDURE — 99231 SBSQ HOSP IP/OBS SF/LOW 25: CPT

## 2022-10-08 RX ADMIN — Medication 1 MILLIGRAM(S): at 21:54

## 2022-10-08 RX ADMIN — Medication 4 MILLIGRAM(S): at 17:28

## 2022-10-08 RX ADMIN — Medication 1 PATCH: at 08:53

## 2022-10-08 RX ADMIN — Medication 4 MILLIGRAM(S): at 13:11

## 2022-10-08 RX ADMIN — OLANZAPINE 10 MILLIGRAM(S): 15 TABLET, FILM COATED ORAL at 21:54

## 2022-10-08 RX ADMIN — Medication 1 PATCH: at 18:39

## 2022-10-08 NOTE — BH INPATIENT PSYCHIATRY PROGRESS NOTE - MSE UNSTRUCTURED FT
On exam, patient with fair hygiene. Fair eye contact, cooperative with interview.  Speech:  normal productivity, volume, rate, fluency.    Normal gait/station.   Motor: no PMA/PMR evident.   Mood is "good"  Affect: blunted/flat, mood congruent.   Thought process: patient mostly linear, goal directed  Thought content: no delusions elicited, denies IOR, denies SI/HI/VI  Perceptual: denies AH/CAH/VH, no other sensory disturbances elicited.   Cognition: AAOx3  Attention/concentration: distractible   Insight: limited  Judgement: impaired   Impulse control:  intact at this time.

## 2022-10-08 NOTE — BH INPATIENT PSYCHIATRY PROGRESS NOTE - NSBHASSESSSUMMFT_PSY_ALL_CORE
Patient is a year 36 old, male; domicile with brother in [place]; single; noncaregiver; works free [employer] as a ; no formal psychiatric treatment; no known suicide attempts; no known history of violence or arrests; h/o cannabis abuse, no known history of complicated withdrawal; PMH unremarkable; brought in by cousin for bizarre behavior, paranoia, suspected delusions of guilt, AH/VH, disorientation and wandering  Diagnosis   Unspecified Psychosis  R/O schizophreniform Disorder  R/O Brief Psychotic Reaction  R/O substance induced psychotic disorder      On assessment, patient is calm and cooperative. He denies any SI or AVH.  Continue with plan as per primary team:    PLAN:  Patient requires acute care  Admitted on a 939 emergency status  Safety: dc co, patient appearing in good behavioral control at this time, seems reality oriented, not expected to pose risk to self or others in controlled inpatient setting at this time.   -Psychosis:   	Based on severity of symptoms including aggression and history of response to olanzapine in brother will start SGA trial  	c/w Olanzapine 10 mg HS (choice over aripiprazole and risperidone based on family history of response)  	c/w clonazepam 1 mg HS for anxiety/insomnia, will plan to taper prior to dc as patient does not want to take habit forming medications  -PRNS: OLZ 10 IM PRNs to be avoided within 1 hr of IM ativan, however given severity of agitation in ED will continue both to ensure adequate options to manage 	agitation and safety of patient and others. Ativan 2mg IM/PO, Olanzapine 7.5mg PO Q6h PRN  -Substance: only admitting to regular cannabis use, will f/u further w patient.   	Smokes 1 PPD sometimes more, ordered for 21mg Nicotine Patch and 4mg Gum q2h PRN  -Medical: EKG showing Incomplete RBBB, likely non-significant, patient denies CP, SOB, palpitations, but will recheck EKG next week to determine stability of 	incomplete block on olanzapine. Discussed risk benefit of meds with patient including but not limited to NMS, TD, orthostasis, metabolic abnormalities, 	HLD, and uncertain but likely slightly elevated risk of cardiac AE related to incomplete RBBB (and all APs), patient amenable to tx.  Will repeat EKG next 	week, attempt repeat utox if patient amenable, consider send out for K2/Spice.   -Dispo / Collateral: Discussed hx and tx with cousin Lexx (who is psych RN) and parents (on 10/7) in agreement with plan for olz trial

## 2022-10-08 NOTE — BH INPATIENT PSYCHIATRY PROGRESS NOTE - NSBHFUPINTERVALHXFT_PSY_A_CORE
Chart reviewed and case discussed with treatment team. No events reported overnight. Sleep and appetite is fair. Patient denies any SI/HI or AVH. Patient is out on the unit, encouraged to attend groups. Patient is compliant with medications, no adverse effects reported.

## 2022-10-09 PROCEDURE — 99231 SBSQ HOSP IP/OBS SF/LOW 25: CPT

## 2022-10-09 RX ORDER — POLYETHYLENE GLYCOL 3350 17 G/17G
17 POWDER, FOR SOLUTION ORAL DAILY
Refills: 0 | Status: DISCONTINUED | OUTPATIENT
Start: 2022-10-09 | End: 2022-10-17

## 2022-10-09 RX ADMIN — Medication 4 MILLIGRAM(S): at 12:35

## 2022-10-09 RX ADMIN — Medication 1 PATCH: at 09:15

## 2022-10-09 RX ADMIN — OLANZAPINE 10 MILLIGRAM(S): 15 TABLET, FILM COATED ORAL at 21:02

## 2022-10-09 RX ADMIN — Medication 1 MILLIGRAM(S): at 21:02

## 2022-10-09 RX ADMIN — Medication 4 MILLIGRAM(S): at 21:03

## 2022-10-09 NOTE — BH INPATIENT PSYCHIATRY PROGRESS NOTE - NSBHASSESSSUMMFT_PSY_ALL_CORE
Patient is a year 36 old, male; domicile with brother in [place]; single; noncaregiver; works free [employer] as a ; no formal psychiatric treatment; no known suicide attempts; no known history of violence or arrests; h/o cannabis abuse, no known history of complicated withdrawal; PMH unremarkable; brought in by cousin for bizarre behavior, paranoia, suspected delusions of guilt, AH/VH, disorientation and wandering  Diagnosis   Unspecified Psychosis  R/O schizophreniform Disorder  R/O Brief Psychotic Reaction  R/O substance induced psychotic disorder      On assessment, patient is calm and cooperative. He denies any SI or AVH.  Continue with plan as per primary team:    PLAN:  Patient requires acute care  Admitted on a 939 emergency status  Safety: dc co, patient appearing in good behavioral control at this time, seems reality oriented, not expected to pose risk to self or others in controlled inpatient setting at this time.   -Psychosis:   	Based on severity of symptoms including aggression and history of response to olanzapine in brother will start SGA trial  	c/w Olanzapine 10 mg HS (choice over aripiprazole and risperidone based on family history of response)  	c/w clonazepam 1 mg HS for anxiety/insomnia, will plan to taper prior to dc as patient does not want to take habit forming medications  -PRNS: OLZ 10 IM PRNs to be avoided within 1 hr of IM ativan, however given severity of agitation in ED will continue both to ensure adequate options to manage 	agitation and safety of patient and others. Ativan 2mg IM/PO, Olanzapine 7.5mg PO Q6h PRN  -Substance: only admitting to regular cannabis use, will f/u further w patient.   	Smokes 1 PPD sometimes more, ordered for 21mg Nicotine Patch and 4mg Gum q2h PRN  -Medical: EKG showing Incomplete RBBB, likely non-significant, patient denies CP, SOB, palpitations, but will recheck EKG next week to determine stability of 	incomplete block on olanzapine. Discussed risk benefit of meds with patient including but not limited to NMS, TD, orthostasis, metabolic abnormalities, 	HLD, and uncertain but likely slightly elevated risk of cardiac AE related to incomplete RBBB (and all APs), patient amenable to tx.  Will repeat EKG next 	week, attempt repeat utox if patient amenable, consider send out for K2/Spice.   -Dispo / Collateral: Discussed hx and tx with cousin Lexx (who is psych RN) and parents (on 10/7) in agreement with plan for olz trial

## 2022-10-09 NOTE — BH INPATIENT PSYCHIATRY PROGRESS NOTE - CURRENT MEDICATION
MEDICATIONS  (STANDING):  clonazePAM  Tablet 1 milliGRAM(s) Oral at bedtime  nicotine - 21 mG/24Hr(s) Patch 1 Patch Transdermal daily  OLANZapine Disintegrating Tablet 10 milliGRAM(s) Oral at bedtime    MEDICATIONS  (PRN):  LORazepam     Tablet 2 milliGRAM(s) Oral every 4 hours PRN agitation  LORazepam   Injectable 2 milliGRAM(s) IntraMuscular Once PRN Agitation  nicotine  Polacrilex Gum 4 milliGRAM(s) Oral every 2 hours PRN cravings  OLANZapine 7.5 milliGRAM(s) Oral every 4 hours PRN agitation  OLANZapine Injectable 10 milliGRAM(s) IntraMuscular once PRN severe agitation  polyethylene glycol 3350 17 Gram(s) Oral daily PRN constipation

## 2022-10-09 NOTE — BH INPATIENT PSYCHIATRY PROGRESS NOTE - MSE UNSTRUCTURED FT
On exam, patient with fair hygiene. Fair eye contact, cooperative with interview.  Speech:  normal productivity, volume, rate, fluency.    Normal gait/station.   Motor: no PMA/PMR evident.   Mood is "good"  Affect: neutral, mood congruent.   Thought process: patient mostly linear, goal directed  Thought content: no delusions elicited, denies IOR, denies SI/HI/VI  Perceptual: denies AH/CAH/VH, no other sensory disturbances elicited.   Cognition: AAOx3  Attention/concentration: distractible   Insight: limited  Judgement: impaired   Impulse control:  intact at this time.

## 2022-10-09 NOTE — BH INPATIENT PSYCHIATRY PROGRESS NOTE - NSBHFUPINTERVALHXFT_PSY_A_CORE
Chart reviewed and case discussed with treatment team. No events reported overnight. Sleep and appetite is fair. Patient denies any SI/HI or AVH. Patient is out on the unit, no behavioral issues reported. He was encouraged to attend groups. Patient is compliant with medications, no adverse effects reported.

## 2022-10-10 PROCEDURE — 99232 SBSQ HOSP IP/OBS MODERATE 35: CPT | Mod: GC

## 2022-10-10 RX ORDER — CLONAZEPAM 1 MG
0.5 TABLET ORAL AT BEDTIME
Refills: 0 | Status: DISCONTINUED | OUTPATIENT
Start: 2022-10-10 | End: 2022-10-10

## 2022-10-10 RX ORDER — CLONAZEPAM 1 MG
1 TABLET ORAL AT BEDTIME
Refills: 0 | Status: DISCONTINUED | OUTPATIENT
Start: 2022-10-10 | End: 2022-10-13

## 2022-10-10 RX ADMIN — Medication 1 PATCH: at 09:36

## 2022-10-10 RX ADMIN — Medication 4 MILLIGRAM(S): at 16:32

## 2022-10-10 RX ADMIN — Medication 1 PATCH: at 09:18

## 2022-10-10 RX ADMIN — Medication 1 MILLIGRAM(S): at 22:35

## 2022-10-10 RX ADMIN — OLANZAPINE 10 MILLIGRAM(S): 15 TABLET, FILM COATED ORAL at 22:35

## 2022-10-10 RX ADMIN — POLYETHYLENE GLYCOL 3350 17 GRAM(S): 17 POWDER, FOR SOLUTION ORAL at 22:36

## 2022-10-10 RX ADMIN — Medication 4 MILLIGRAM(S): at 18:55

## 2022-10-10 RX ADMIN — Medication 4 MILLIGRAM(S): at 09:18

## 2022-10-10 NOTE — BH INPATIENT PSYCHIATRY PROGRESS NOTE - CURRENT MEDICATION
MEDICATIONS  (STANDING):  clonazePAM  Tablet 1 milliGRAM(s) Oral at bedtime  nicotine - 21 mG/24Hr(s) Patch 1 Patch Transdermal daily  OLANZapine Disintegrating Tablet 10 milliGRAM(s) Oral at bedtime    MEDICATIONS  (PRN):  LORazepam     Tablet 2 milliGRAM(s) Oral every 4 hours PRN agitation  LORazepam   Injectable 2 milliGRAM(s) IntraMuscular Once PRN Agitation  nicotine  Polacrilex Gum 4 milliGRAM(s) Oral every 2 hours PRN cravings  OLANZapine 7.5 milliGRAM(s) Oral every 4 hours PRN agitation  OLANZapine Injectable 10 milliGRAM(s) IntraMuscular once PRN severe agitation  polyethylene glycol 3350 17 Gram(s) Oral daily PRN constipation   MEDICATIONS  (STANDING):  clonazePAM  Tablet 0.5 milliGRAM(s) Oral at bedtime  nicotine - 21 mG/24Hr(s) Patch 1 Patch Transdermal daily  OLANZapine Disintegrating Tablet 10 milliGRAM(s) Oral at bedtime    MEDICATIONS  (PRN):  LORazepam     Tablet 2 milliGRAM(s) Oral every 4 hours PRN agitation  LORazepam   Injectable 2 milliGRAM(s) IntraMuscular Once PRN Agitation  nicotine  Polacrilex Gum 4 milliGRAM(s) Oral every 2 hours PRN cravings  OLANZapine 7.5 milliGRAM(s) Oral every 4 hours PRN agitation  OLANZapine Injectable 10 milliGRAM(s) IntraMuscular once PRN severe agitation  polyethylene glycol 3350 17 Gram(s) Oral daily PRN constipation

## 2022-10-10 NOTE — BH INPATIENT PSYCHIATRY PROGRESS NOTE - NSBHFUPINTERVALHXFT_PSY_A_CORE
Patient seen for follow up of psychosis.  Chart reviewed and case discussed with treatment team. No events reported overnight. Sleep and appetite is fair. Compliant with standing medications, no PRNs required/requested in the last 24 hours.  On interview, patient reports continued improvement in his psychotic symptoms. States that he still has "some background noise" but is less distressed by it and able to ignore it. Denies CAH or paranoia. Has been in good behavioral control in the milieu. Remains oddly related with ambivalence about stopping his THC use.  Patient denies any SI/HI or VH.      Patient seen for follow up of psychosis.  Chart reviewed and case discussed with treatment team. No events reported overnight. Sleep and appetite is fair. Compliant with standing medications, no PRNs required/requested in the last 24 hours.  On interview, patient reported that he has ongoing CAH, specifically telling him to read a book he is not really enjoying, he noted that he is able to disregard commands but feels he should continue reading the book anyway. He notes that  tell him many negative things, and he believes this might all related to a deal he made with the devil while in highschool. He feels however that the olanzapine is helping him get better clarity and is amenable to increasing the dose tonight. He denies that he has had any recent CAH to harm self or others, and denies SI/HI. Verbalized willingness to come to staff if any escalation in AH/CAH or if SI returns. continued improvement in his psychotic symptoms. Has been in good behavioral control in the milieu.

## 2022-10-10 NOTE — BH INPATIENT PSYCHIATRY PROGRESS NOTE - NSBHFUPINTERVALCCFT_PSY_A_CORE
"I just ened to pull myself up, find my strength, and get myself out of this" "I just need to pull myself up, find my strength, and get myself out of this"

## 2022-10-10 NOTE — BH INPATIENT PSYCHIATRY PROGRESS NOTE - NSBHASSESSSUMMFT_PSY_ALL_CORE
Patient is a year 36 old man, lives with brother and roommate in [place], single, noncaregiver, works [employer] as a , no significant PMH, PPH notable for family history of schizophrenia, no formal psychiatric treatment, recently seen at OSH ED, active cannabis abuse, no known suicide attempts, no known history of complicated withdrawal, no known history of violence or arrests, brought in by cousin for bizarre behavior.    In the ED patient was agitated and uncooperative, unable to give a history. Collateral obtained from cousin revealed 1 week of bizarre behavior, paranoia, disappearing for 24 hours at a time, delusions of guilt, suspected AVH. On admission, patient was heavily sedated. He was started on olanzapine 10 mg qd for psychosis with rapid improvement in symptoms of disorganization and agitation.     DDx:  Psychosis NOS - high suspicion for first break psychotic episode given strong family history  R/o brief psychotic reaction vs substance induced psychotic disorder      Plan:  1.	Legal: continue 9.39 emergency admission    2.	Safety: routine obs appropriate as pt denying active SI/HI, no longer agitated  - Haldol/Ativan PRN agitation  3.	Psychiatric: psychosis NOS  - continue with olanzapine 10 mg qhs, will defer increasing the dose at this time given good symptom response at this time  - taper clonazepam: decrease from 1 mg qhs to 0.5 mg qhs   -PRNS: OLZ 10 IM PRNs to be avoided within 1 hr of IM ativan, however given severity of agitation in ED will continue both to ensure adequate options to manage agitation and safety of patient and others. Ativan 2mg IM/PO, Olanzapine 7.5mg PO Q6h PRN     4. Substance: only admitting to regular cannabis use, ambivalent about quitting  - Smokes 1 PPD sometimes more  - 21mg Nicotine Patch and 4mg Gum q2h PRN    5.	Group/Milieu therapy as tolerated    6.	Medical: EKG showing Incomplete RBBB, likely non-significant, patient denies CP, SOB, palpitations, but will recheck EKG to determine stability of incomplete block on olanzapine. Discussed risk benefit of meds with patient including but not limited to NMS, TD, orthostasis, metabolic abnormalities, HLD, and uncertain but likely slightly elevated risk of cardiac AE related to incomplete RBBB (and all APs), patient amenable to treatment.   - attempt repeat utox if patient amenable, consider send out for K2/Spice.   - repeat EKG    7.	Collateral/Dispo:   -  Discussed hx and tx with cousin Lexx (who is psych RN) and parents (on 10/7) in agreement with plan for olz trial  - dispo pending, appreciate SW support Patient is a year 36 old man, lives with brother and roommate in [place], single, noncaregiver, works [employer] as a , no significant PMH, PPH notable for family history of schizophrenia, no formal psychiatric treatment, recently seen at OSH ED, active cannabis abuse, no known suicide attempts, no known history of complicated withdrawal, no known history of violence or arrests, brought in by cousin for bizarre behavior.    In the ED patient was agitated and uncooperative, unable to give a history. Collateral obtained from cousin revealed 1 week of bizarre behavior, paranoia, disappearing for 24 hours at a time, delusions of guilt, suspected AVH. On admission, patient was heavily sedated. He was started on olanzapine 10 mg qd for psychosis with rapid improvement in symptoms of disorganization and agitation.     DDx:  Psychosis NOS - high suspicion for first break psychotic episode given strong family history, no more forthcoming about sx, which are seeming increasingly consistent with FEP schizophrenia. +Delusions of guilt and CAH but thus far without concerning content and patient verbalizing willigness to come to staff if changes in status of AH or if he is feeling suicidal.     Plan:  1.	Legal: continue 9.39 emergency admission    2.	Safety: routine obs appropriate as pt denying active SI/HI, no longer agitated, verbalized willingness to come to staff if concerning CAH or if with return of SI.   - Haldol/Ativan PRN agitation  3.	Psychiatric: psychosis NOS  - Increase olanzapine to 15 mg qhs  - taper clonazepam: c/w clonazepam 1mg HS  -PRNS: OLZ 10 IM PRNs to be avoided within 1 hr of IM ativan, however given severity of agitation in ED will continue both to ensure adequate options to manage agitation and safety of patient and others. Ativan 2mg IM/PO, Olanzapine 7.5mg PO Q6h PRN     4. Substance: only admitting to regular cannabis use, ambivalent about quitting  - Smokes 1 PPD sometimes more  - 21mg Nicotine Patch and 4mg Gum q2h PRN    5.	Group/Milieu therapy as tolerated    6.	Medical: EKG showing Incomplete RBBB, likely non-significant, patient denies CP, SOB, palpitations, but will recheck EKG to determine stability of incomplete block on olanzapine. Discussed risk benefit of meds with patient including but not limited to NMS, TD, orthostasis, metabolic abnormalities, HLD, and uncertain but likely slightly elevated risk of cardiac AE related to incomplete RBBB (and all APs), patient amenable to treatment.   - attempt repeat utox if patient amenable, consider send out for K2/Spice.   - repeat EKG    7.	Collateral/Dispo:   -  Discussed hx and tx with cousin Lexx (who is psych RN) and parents (on 10/7) in agreement with plan for olz trial  - dispo pending, appreciate SW support

## 2022-10-10 NOTE — BH INPATIENT PSYCHIATRY PROGRESS NOTE - NSBHATTESTCOMMENTATTENDFT_PSY_A_CORE
Patient seen, chart reviewed, case discussed with team.  I saw the patient with the resident, discussed case with resident and reviewed all sections of the note, made changes where appropriate and agree with it as written.

## 2022-10-10 NOTE — BH INPATIENT PSYCHIATRY PROGRESS NOTE - MSE UNSTRUCTURED FT
On exam, patient with fair hygiene. Fair eye contact, cooperative with interview. Appears somewhat emaciated.  Speech: normal productivity, volume, rate, fluency.    Normal gait/station.   Motor: no PMA/PMR evident.   Mood is "good"  Affect: blunted, flat   Thought process: patient mostly linear, slightly impaired reasoning  Thought content: somewhat bizarre at times, no thaddeus delusions elicited, denies IOR, denies SI/HI/VI  Perceptual: denies AH/CAH/VH, no other sensory disturbances elicited.  Does not appear internally preoccupied.  Cognition: AAOx3  Attention/concentration: distractible   Insight: limited  Judgement: impaired   Impulse control:  intact at this time.   On exam, patient with fair hygiene. Fair eye contact, cooperative with interview. Appears somewhat emaciated.  Speech: normal productivity, volume, rate, fluency.    Normal gait/station.   Motor: no PMA/PMR evident.   Mood is "worried"  Affect: blunted, flat   Thought process: patient mostly linear, slightly impaired reasoning  Thought content: somewhat bizarre at times, +delusions of guilt, denies IOR, denies SI/HI/VI  Perceptual: endorsing AH and CAH, thus far CAH has been relatively benign (today reports being told to read his book) no other sensory disturbances elicited.  Does not appear internally preoccupied.  Cognition: AAOx3  Attention/concentration: distractible   Insight: limited  Judgement: impaired   Impulse control:  intact at this time.

## 2022-10-11 PROCEDURE — 99232 SBSQ HOSP IP/OBS MODERATE 35: CPT

## 2022-10-11 PROCEDURE — 93010 ELECTROCARDIOGRAM REPORT: CPT

## 2022-10-11 PROCEDURE — 90853 GROUP PSYCHOTHERAPY: CPT

## 2022-10-11 RX ORDER — LACTULOSE 10 G/15ML
10 SOLUTION ORAL ONCE
Refills: 0 | Status: COMPLETED | OUTPATIENT
Start: 2022-10-11 | End: 2022-10-11

## 2022-10-11 RX ADMIN — Medication 4 MILLIGRAM(S): at 19:51

## 2022-10-11 RX ADMIN — OLANZAPINE 10 MILLIGRAM(S): 15 TABLET, FILM COATED ORAL at 20:37

## 2022-10-11 RX ADMIN — LACTULOSE 10 GRAM(S): 10 SOLUTION ORAL at 15:17

## 2022-10-11 RX ADMIN — Medication 1 PATCH: at 08:42

## 2022-10-11 RX ADMIN — Medication 1 MILLIGRAM(S): at 20:37

## 2022-10-11 NOTE — BH INPATIENT PSYCHIATRY PROGRESS NOTE - NSBHFUPINTERVALHXFT_PSY_A_CORE
Patient seen for follow up of psychosis.  Chart reviewed and case discussed with treatment team. No events reported overnight. Slept well last night, appetite considerably improved per patient. Compliant with standing medications, no PRNs required/requested in the last 24 hours.  On interview, patient reported that he has had less frequent and less distressing AH, denies CAH today, reports he feels better following his reporting more honestly about symptoms and thoughts yesterday, as he had been struggling alone out of fear over what others might think about his sx and hx. Patient denies SI/HI is more hopeful for future, discussed cannabis use, though patient not yet ready to commit to abstinence he is motivated to reduce frequency and intensity of use. He is amenable to ETP referral for outpatient once sx stabilized. Has been in good behavioral control in the milieu. Denies side effects from meds.

## 2022-10-11 NOTE — BH PSYCHOLOGY - GROUP THERAPY NOTE - TOKEN PULL-DIAGNOSIS
Primary Diagnosis:  Psychosis, unspecified psychosis type [F29]      Psychosis [F29]        Problem Dx:   Psychosis, unspecified psychosis type [F29]

## 2022-10-11 NOTE — BH INPATIENT PSYCHIATRY PROGRESS NOTE - NSBHASSESSSUMMFT_PSY_ALL_CORE
Patient is a year 36 old man, lives with brother and roommate in [place], single, noncaregiver, works [employer] as a , no significant PMH, PPH notable for family history of schizophrenia, no formal psychiatric treatment, recently seen at OSH ED, active cannabis abuse, no known suicide attempts, no known history of complicated withdrawal, no known history of violence or arrests, brought in by cousin for bizarre behavior.    In the ED patient was agitated and uncooperative, unable to give a history. Collateral obtained from cousin revealed 1 week of bizarre behavior, paranoia, disappearing for 24 hours at a time, delusions of guilt, suspected AVH. On admission, patient was heavily sedated. He was started on olanzapine 10 mg qd for psychosis with rapid improvement in symptoms of disorganization and agitation.     10/11:   Patient reporting improvement in mood, anxiety, guilt, AH/CAH following more candid conversation about concerns, thoughts and sx he has been having. Has been in good behavioral control, visible in milieu. Amenable to ETP referral. Is amenable to harm reduction approach to reducing cannabis use, not yet ready to commit to full abstinence, but does report plan to stop using frequently and intends to cut down on amount consumed. Ordered for repeat EKG today to f/u on incomplete RBBB observed on admit.     Olanzapine increased from 10mg HS to 15mg on 10/10, tolerating well    Plan:  1.	Legal: continue 9.39 emergency admission  2.	Safety: routine obs appropriate as pt denying active SI/HI, no longer agitated, verbalized willingness to come to staff if concerning CAH or if with return of SI.   - Haldol/Ativan PRN agitation  3.	Psychiatric: psychosis NOS  -  olanzapine 15 mg qhs  -  c/w clonazepam 1mg HS, plan to taper at least to 0.5mg HS upon dc.   -PRNS: OLZ 10 IM PRNs to be avoided within 1 hr of IM ativan, however given severity of agitation in ED will continue both to ensure adequate options to manage agitation and safety of patient and others. Ativan 2mg IM/PO, Olanzapine 7.5mg PO Q6h PRN     4. Substance: only admitting to regular cannabis use, ambivalent about quitting  - Smokes 1 PPD sometimes more  - 21mg Nicotine Patch and 4mg Gum q2h PRN    5.	Group/Milieu therapy as tolerated    6.	Medical: EKG showing Incomplete RBBB, likely non-significant, patient denies CP, SOB, palpitations, but will recheck EKG to determine stability of incomplete block on olanzapine. Discussed risk benefit of meds with patient including but not limited to NMS, TD, orthostasis, metabolic abnormalities, HLD, and uncertain but likely slightly elevated risk of cardiac AE related to incomplete RBBB (and all APs), patient amenable to treatment.   - attempt repeat utox if patient amenable, consider send out for K2/Spice.   - repeat EKG    7.	Collateral/Dispo:   -  Discussed hx and tx with cousin Lexx (who is psych RN) and parents (on 10/7) in agreement with plan for olz trial  - dispo pending, appreciate SW support

## 2022-10-11 NOTE — BH INPATIENT PSYCHIATRY PROGRESS NOTE - NSBHADMITIPREASONDETAILS_PSY_A_CORE FT
Wandering, disrobing, not eating, agitated and aggressive. 

## 2022-10-11 NOTE — BH PSYCHOLOGY - GROUP THERAPY NOTE - NSPSYCHOLGRPCOGPROB_PSY_A_CORE FT
Anxiety, Depression, Emotion Dysregulation, Poor Coping Skills, Psychosocial Stressors, Inadequate Self-Care, Ineffective Problem Solving Skills.

## 2022-10-11 NOTE — BH PSYCHOLOGY - GROUP THERAPY NOTE - NSPSYCHOLGRPCOGPT_PSY_A_CORE FT
Patient attended Cognitive Behavioral Therapy Group. Acceptance and Commitment Therapy principles, concepts, skills and techniques were also utilized in the group. The group started with group rules, introductions and brief individual check-ins. The topics discussed in group were: Negative Self-Talk and Cognitive Perceptions.  facilitated the group process, as well as provided support and psychoeducation.

## 2022-10-11 NOTE — BH PSYCHOLOGY - GROUP THERAPY NOTE - NSBHPSYCHOLPARTICIPCOMMENT_PSY_A_CORE FT
Patient attend Psychology group. He actively and meaningfully participated in the group discussion on Negative Self-Talk and Cognitive Perceptions. Patient shared that our perceptions shape our reality. He discussed how he was worried about other's judging him for past actions. Patient discussed how he attempts to defuse himself from his thoughts using the technique of reframing, to change the impact his thoughts have on his emotions. Patient supported other peers in their experiences.

## 2022-10-11 NOTE — BH INPATIENT PSYCHIATRY PROGRESS NOTE - MSE UNSTRUCTURED FT
On exam, patient with good hygiene and grooming. Appropriate eye contact, cooperative with interview.   Speech: normal productivity, volume, rate, fluency.    Normal gait/station.   Motor: no PMA/PMR evident.   Mood is "better"  Affect: euthymic, more reactive   Thought process: linear, goal directed  Thought content: +delusions of guilt, denies IOR, denies SI/HI/VI  Perceptual: endorsing AH and CAH though denies CAH today and notes reduced frequency and intensity.   Cognition: AAOx3  Attention/concentration: grossly intact  Insight: good  Judgement: fair  Impulse control:  intact at this time.

## 2022-10-12 PROCEDURE — 99232 SBSQ HOSP IP/OBS MODERATE 35: CPT

## 2022-10-12 RX ADMIN — Medication 1 MILLIGRAM(S): at 20:23

## 2022-10-12 RX ADMIN — Medication 4 MILLIGRAM(S): at 14:49

## 2022-10-12 RX ADMIN — OLANZAPINE 10 MILLIGRAM(S): 15 TABLET, FILM COATED ORAL at 20:22

## 2022-10-12 RX ADMIN — POLYETHYLENE GLYCOL 3350 17 GRAM(S): 17 POWDER, FOR SOLUTION ORAL at 08:48

## 2022-10-12 RX ADMIN — Medication 1 PATCH: at 08:49

## 2022-10-12 RX ADMIN — Medication 4 MILLIGRAM(S): at 11:06

## 2022-10-12 RX ADMIN — Medication 4 MILLIGRAM(S): at 21:12

## 2022-10-12 NOTE — BH TREATMENT PLAN - NSTXDCOPLKINTERSW_PSY_ALL_CORE
SW reviewed EMR, met with pt, communicated with cousin Cindy to gain collateral information. SW met with team regarding tx options.

## 2022-10-12 NOTE — BH TREATMENT PLAN - NSTXPATIENTPARTICIPATE_PSY_ALL_CORE
Patient participated in identification of needs/problems/goals for treatment
Patient participated in identification of needs/problems/goals for treatment/Patient participated in defining interventions/Patient participated in development of after care plan

## 2022-10-12 NOTE — BH INPATIENT PSYCHIATRY PROGRESS NOTE - MSE UNSTRUCTURED FT
On exam today the patient is calm and is generally cooperative.    Speech is clear and of normal rate.    Thought process: linear and goal directed.    Thought content: with no evidence of false beliefs or obsessions.   Perception: Denies hearing command AH today and overall voices are less frequent    Mood: Describes as "OK".   Affect: flat.    Patient denies active suicidal ideation, intention and plan.    Patient denies active aggressive/homicidal ideation, intent or plan.   Patient is Alert and oriented .   Fund of knowledge is fair. Memory is intact  Insight and judgment are fair.   Impulse control is intact at this time.

## 2022-10-12 NOTE — BH TREATMENT PLAN - NSTXPSYCHOINTERPR_PSY_ALL_CORE
Psych rehab staff will continue to assist patient in psych rehab goals pertaining to identifying healthy and effective coping skills to help mitigate hallucinations as well as attending daily psych rehab groups for improved symptom management within seven days. Multiple very soft BMs.  Not quite diarrhea.  No fevers or chills.  No abd pain.  No CP or SOb.    Vital Signs Last 24 Hrs  T(C): 36.4 (01 Oct 2017 08:25), Max: 36.8 (30 Sep 2017 21:42)  T(F): 97.5 (01 Oct 2017 08:25), Max: 98.3 (30 Sep 2017 21:42)  HR: 99 (01 Oct 2017 08:25) (98 - 110)  BP: 100/61 (01 Oct 2017 08:25) (100/61 - 107/62)  BP(mean): --  RR: 18 (01 Oct 2017 08:25) (18 - 18)  SpO2: 98% (01 Oct 2017 08:25) (92% - 100%)    GENERAL: NAD, AAOx2  HEAD:  Atraumatic, Normocephalic  EYES: EOMI  BUCCAL: dry mucosa  CHEST/LUNG: decreased BS, coarse  HEART: Regular rate and rhythm; + murmur  ABDOMEN: Soft, Nontender, Nondistended; Bowel sounds present, multiple erythematous areas, no pus  : chronic tavarez  EXTREMITIES:  2+ Peripheral Pulses, + 2 pitting edema both LE  SKIN: No rashes or lesions  NEURO: No focal deficits, confused at times    LABS:                        13.0   10.1  )-----------( 102      ( 30 Sep 2017 06:53 )             39.3     09-30    150<H>  |  119<H>  |  52<H>  ----------------------------<  106<H>  4.4   |  21<L>  |  1.05    Ca    9.2      30 Sep 2017 06:53        CAPILLARY BLOOD GLUCOSE  99 (01 Oct 2017 08:25)  132 (30 Sep 2017 21:42)  93 (30 Sep 2017 12:07)

## 2022-10-12 NOTE — BH INPATIENT PSYCHIATRY PROGRESS NOTE - NSBHASSESSSUMMFT_PSY_ALL_CORE
Patient is a year 36 old, male; domicile with brother in [place]; single; noncaregiver; unemployed ; no formal psychiatric treatment; no known suicide attempts; no known history of violence or arrests; h/o cannabis abuse, no known history of complicated withdrawal; PMH unremarkable; brought in by cousin for bizarre behavior.        Diagnosis   Unspecified Psychosis  R/O schizophreniform Disorder  R/O Brief Psychotic Reaction      10/12 update  Some improvement overall with less AH and denies command AH today    PLAN:  Patient requires acute care  Admitted on a 939 emergency status   Olanzapine 10 mg HS (choice over aripiprazole and risperidone based on family history of response)  clonazepam 1 mg HS standing for anxiety and agitation

## 2022-10-12 NOTE — BH INPATIENT PSYCHIATRY PROGRESS NOTE - CURRENT MEDICATION
MEDICATIONS  (STANDING):  clonazePAM  Tablet 1 milliGRAM(s) Oral at bedtime  nicotine - 21 mG/24Hr(s) Patch 1 Patch Transdermal daily  OLANZapine Disintegrating Tablet 10 milliGRAM(s) Oral at bedtime    MEDICATIONS  (PRN):  bisacodyl 5 milliGRAM(s) Oral every 12 hours PRN Constipation  LORazepam     Tablet 2 milliGRAM(s) Oral every 4 hours PRN agitation  LORazepam   Injectable 2 milliGRAM(s) IntraMuscular Once PRN Agitation  nicotine  Polacrilex Gum 4 milliGRAM(s) Oral every 2 hours PRN cravings  OLANZapine 7.5 milliGRAM(s) Oral every 4 hours PRN agitation  OLANZapine Injectable 10 milliGRAM(s) IntraMuscular once PRN severe agitation  polyethylene glycol 3350 17 Gram(s) Oral daily PRN constipation

## 2022-10-12 NOTE — BH TREATMENT PLAN - NSDCCRITERIA_PSY_ALL_CORE
Patient will be less psychotic and stable for discharge with a CGI Improvement score = 2
Patient will be less psychotic and stable for discharge with a CGI Improvement score = 2

## 2022-10-12 NOTE — BH TREATMENT PLAN - NSTXCAREGIVERPARTICIPATE_PSY_P_CORE
Please call [facility] regarding medication they have questions from todays visit.     [phone]    Thanks
Family/Caregiver participated in identification of needs/problems/goals for treatment/Family/Caregiver participated in defining interventions/Family/Caregiver participated in development of after care plan
Family/Caregiver participated in identification of needs/problems/goals for treatment/Family/Caregiver participated in defining interventions/Family/Caregiver participated in development of after care plan

## 2022-10-12 NOTE — BH INPATIENT PSYCHIATRY PROGRESS NOTE - NSBHFUPINTERVALHXFT_PSY_A_CORE
Patient seen for follow up of psychosis.  Chart reviewed and case discussed with treatment team.   No events reported overnight.   Compliant with standing medications, no PRNs required/requested in the last 24 hours.  On interview, patient reported that he did not have a command AH today  Has been preoccupied with his weight and told father he has been losing lots of weight on the Unit'  Also anxious about finances and working after he improves

## 2022-10-13 LAB — SARS-COV-2 RNA SPEC QL NAA+PROBE: SIGNIFICANT CHANGE UP

## 2022-10-13 RX ORDER — ACETAMINOPHEN 500 MG
650 TABLET ORAL EVERY 6 HOURS
Refills: 0 | Status: DISCONTINUED | OUTPATIENT
Start: 2022-10-13 | End: 2022-10-17

## 2022-10-13 RX ORDER — BENZOCAINE AND MENTHOL 5; 1 G/100ML; G/100ML
1 LIQUID ORAL EVERY 4 HOURS
Refills: 0 | Status: DISCONTINUED | OUTPATIENT
Start: 2022-10-13 | End: 2022-10-17

## 2022-10-13 RX ORDER — OLANZAPINE 15 MG/1
1 TABLET, FILM COATED ORAL
Qty: 15 | Refills: 0
Start: 2022-10-13 | End: 2022-10-27

## 2022-10-13 RX ADMIN — Medication 1 PATCH: at 08:21

## 2022-10-13 RX ADMIN — OLANZAPINE 10 MILLIGRAM(S): 15 TABLET, FILM COATED ORAL at 21:29

## 2022-10-13 RX ADMIN — Medication 4 MILLIGRAM(S): at 17:04

## 2022-10-13 RX ADMIN — Medication 1 PATCH: at 17:01

## 2022-10-13 RX ADMIN — Medication 10 MILLIGRAM(S): at 08:21

## 2022-10-13 NOTE — BH INPATIENT PSYCHIATRY DISCHARGE NOTE - NSBHMETABOLIC_PSY_ALL_CORE_FT
BMI: BMI (kg/m2): 21.8 (10-07-22 @ 17:31)  HbA1c: A1C with Estimated Average Glucose Result: 5.1 % (10-07-22 @ 09:15)    Glucose:   BP: 112/67 (10-13-22 @ 08:42) (107/68 - 112/67)  Lipid Panel: Date/Time: 10-07-22 @ 09:15  Cholesterol, Serum: 122  Direct LDL: --  HDL Cholesterol, Serum: 57  Total Cholesterol/HDL Ration Measurement: --  Triglycerides, Serum: 69

## 2022-10-13 NOTE — BH INPATIENT PSYCHIATRY PROGRESS NOTE - NSBHASSESSSUMMFT_PSY_ALL_CORE
Patient is a year 36 old, male; domicile with brother in [place]; single; noncaregiver; unemployed ; no formal psychiatric treatment; no known suicide attempts; no known history of violence or arrests; h/o cannabis abuse, no known history of complicated withdrawal; PMH unremarkable; brought in by cousin for bizarre behavior.        Diagnosis   Unspecified Psychosis  R/O schizophreniform Disorder  R/O Brief Psychotic Reaction      10/13 update  Continued improvement overall with no further AH and denies command AH again today  Discussed changing clonazepam 1 mg HS to lorazepam 1 mg HS PRN     PLAN:  Patient requires acute care  Admitted on a 939 emergency status   Olanzapine 10 mg HS (choice over aripiprazole and risperidone based on family history of response)  change clonazepam 1 mg HS to lorazepam 1 mg HS PRN

## 2022-10-13 NOTE — BH INPATIENT PSYCHIATRY DISCHARGE NOTE - HPI (INCLUDE ILLNESS QUALITY, SEVERITY, DURATION, TIMING, CONTEXT, MODIFYING FACTORS, ASSOCIATED SIGNS AND SYMPTOMS)
35 yo Male with no prior formal past history of psychiatric illness but with a brother 5 years older who suffers from schizophrenia  Patient with one week of psychosis including paranoid delusions that he is being followed  He then threw out his phone and ran away as he thought that he how he was being followed  Also reported to family that he was experiencing AH  Was seen in [facility] and discharged  'Then was missing for 24 hours and borrowed brother's car and "lost it"  Was aggressive and agitated in ER requiring IM meds and restraints  On exam today after arrival is very sedated with difficulty answering questions  Admits to both his paranoid thoughts as well as AH  Denies recent substance use and command AH but unclear if this is truly reliable answers  Spoke with patient's cousin for additional info as patient is too sedated to provide HIPPA release  Patient's cousin reports that patient's brother had a robust response to the SGA olanzapine  No known Medical history  No hx of allergies to meds    AS PER ER EVALUATION:  "Patient is a year 36 old, male; domicile with brother in [place]; single; noncaregiver; unemployed ; no formal psychiatric treatment; no known suicide attempts; no known history of violence or arrests; h/o cannabis abuse, no known history of complicated withdrawal; PMH unremarkable; brought in by cousin for bizarre behavior.    Patient is a poor historian and is not able to provide HPI. Upon arrival to ED patient was agitated and uncooperative. He did not respond to verbal de-escalation and received Ativan 2 mg and Haldol 5 mg. Patient was disoriented to place, time and situation. When asked why he was brought to the ED patient stated, " I had chest pain and you brought me here." Patient was unsteady on his feet and banging into walls. His eyes were half closed and his speech was low and mumbled. He denied using illicite substances or alcohol.     Received collateral from cousin Melany who activated EMS. Per Melany patient has been acting bizarre for over a week. Last week patient disappeared for several days . Cousin received a text  message from patient's friend that patient was paranoid and having A/H. On 10/2/22 Melany spoke with patient and he admitted to feeling paranoid and A/H. Patient threw out his phone because he thought people were tracking him. Patient was afraid he was going to penitentiary and was worried that his love ones were going to be harmed. that day his brother allowed him to take his car and patient disappeared for 24 hours. When he returned on Monday he claimed that he had lost the car but still had the keys in his possession. Cousin activated EMS and patient was taken to [facility] and discharged. After discharge patient walked the streets and returned home this evening. Patient appeared disheveled and was walking around naked. He was disorganized and paranoid and appeared to be experiencing visual and auditory hallucinations.   At baseline patient is calm, cooperative and has few select friends. 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  Patient has a h/o abusing Cannabis and possible karla. He has no known legal issues and has never been violent or aggressive. Patient brother, who is several years older was diagnosed with schizophrenia at age 35.

## 2022-10-13 NOTE — BH INPATIENT PSYCHIATRY PROGRESS NOTE - CURRENT MEDICATION
MEDICATIONS  (STANDING):  nicotine - 21 mG/24Hr(s) Patch 1 Patch Transdermal daily  OLANZapine Disintegrating Tablet 10 milliGRAM(s) Oral at bedtime    MEDICATIONS  (PRN):  bisacodyl 10 milliGRAM(s) Oral every 12 hours PRN Constipation  LORazepam     Tablet 1 milliGRAM(s) Oral at bedtime PRN insomnia/anxeity  LORazepam     Tablet 2 milliGRAM(s) Oral every 4 hours PRN agitation  LORazepam   Injectable 2 milliGRAM(s) IntraMuscular Once PRN Agitation  nicotine  Polacrilex Gum 4 milliGRAM(s) Oral every 2 hours PRN cravings  OLANZapine 7.5 milliGRAM(s) Oral every 4 hours PRN agitation  OLANZapine Injectable 10 milliGRAM(s) IntraMuscular once PRN severe agitation  polyethylene glycol 3350 17 Gram(s) Oral daily PRN constipation

## 2022-10-13 NOTE — BH INPATIENT PSYCHIATRY PROGRESS NOTE - NSBHFUPINTERVALHXFT_PSY_A_CORE
Patient seen for follow up of psychosis.  Chart reviewed and case discussed with treatment team.   No events reported overnight.   Compliant with standing medications, no PRNs required/requested in the last 24 hours.  On interview, patient reported that he did not have any auditory Hallucinations today  Still with some disorganization but feels clearer  Asking about changing his clonazepam HS to PRN as he feels some sedation in the AM   Discussed changing clonazepam to lorazepam an making it PRN

## 2022-10-13 NOTE — BH INPATIENT PSYCHIATRY DISCHARGE NOTE - HOSPITAL COURSE
Patient admitted to [address] [place] [address], 10/6/2022 through 10/17/2022    On admission the patient was paranoid and disorganized  He admitted to  including benign commands such as "read this book" that he both felt he could resist but at times compelled to follow  He denied any commands to hurt self and others  He described his disorganization in his head as something "out of the movies" and had little recollection of the details of his thought process when he left abruptly with his brother's car.  He denied that this episode was substance induced or related  With a similar history of Psychosis in older brother who also had his first psychotic break at age 35 it was decided to choose olanzapine as first trial  Despite first episode date for risperidone and aripiprazole the family history of response and tolerability was felt to be an important factor  Patient was started on olanzapine which was titrated to 10 mg with a robust response and good tolerability  Clonazepam was started standing 1 MG HS for anxiety and insomnia but was changed before discharge to lorazepam 1 mg HS PRN  On this regimen the patient improved significantly   His father and mother live in [place] but are available by phone [phone]  He lives with his brother Trever in an apt [phone]  Patient's cousin Melany is a NP at [employer] and is very involved in his care [phone]        Hospital Course of treatment as reviewed above.     On discharge exam today the patient is cooperative and makes fair eye contact.   Speech is clear and of normal rate.  Thought process: with no disorder of thought process.   Thought content: with no evidence of delusional beliefs.   Perception: Denies hallucinations.  Mood: Describes as "improved"   Affect: flat.  Patient denies suicidal and aggressive ideation, intent and plan.   AAO X3. Cognitively grossly intact.   Insight and judgment are improved.  Impulse control is intact at this time    Suicide and risk assessment performed prior to discharge.   The patient has a low acute risk and low chronic risk of self-harm and aggression towards others.   Protective factors include denying SI, no SIB, denying HI, good social supports in their family, no substance abuse, no current mood symptoms, no hopelessness, future-oriented in returning to home, no access to firearms.    Risk factors include presenting illness. Immediate risk was minimized by inpatient admission to a safe environment with appropriate supervision and limited access to lethal means.   Future risk was minimized before discharge by treatment of acute episode, maximizing outpatient support, providing relevant patient education, discussing emergency procedures, and ensuring close follow-up.  The patient remains at a low risk of self-harm, and such risk cannot be further ameliorated by continued inpatient treatment and the patient is therefore appropriate for discharge.       There were no behavioral problems on the unit.  Patient did not become agitated and did not require emergent intramuscular medications or seclusion / restraints.  Patient did not self-harm on the unit.      Patient remained actively engaged in treatment.  Patient participated in individual, group, and milieu therapy.  Patient got along appropriately with staff and peers.     Patient did not have any medical problems during this hospitalization.  There were no medical consultations.    A full discussion of the factors that predict treatment success and relapse was held including safety planning.    A discussion of the risks and benefits of patient’s medication was held before discharge.  This included a discussion of the metabolic risks and risk of EPS and TD     On day of discharge, the patient no longer requires inpatient treatment and care. Patient denies all suicidal and aggressive ideation, intent and plan. Patient denies anxiety symptoms and panic attacks. Patient is not judged to be an acute danger to self or others at this time. Patient will be discharged to home and outpatient follow up at the [facility]    Given a 2 week supply of olanzapine and Lorazepam through [facility]

## 2022-10-13 NOTE — BH INPATIENT PSYCHIATRY PROGRESS NOTE - MSE UNSTRUCTURED FT
On exam today the patient is calm and is generally cooperative.    Speech is clear and of normal rate.    Thought process: linear and goal directed.    Thought content: with no evidence of false beliefs or obsessions.   Perception: Denies hearing command AH and today with NO AH at all    Mood: Describes as "calmer".   Affect: flat.    Patient denies active suicidal ideation, intention and plan.    Patient denies active aggressive/homicidal ideation, intent or plan.   Patient is Alert and oriented .   Fund of knowledge is fair. Memory is intact  Insight and judgment are fair.   Impulse control is intact at this time.

## 2022-10-13 NOTE — BH INPATIENT PSYCHIATRY DISCHARGE NOTE - ATTENDING DISCHARGE PHYSICAL EXAMINATION:
The patient has continued to show improvement in symptoms.  He states AHs are much improved, almost completely gone.  He denies any delusions.  He denies any depression, anxiety, or SI, and his behavior has been well controlled.  The patient has been sleeping well.  The patient has been engaged in treatment on the unit, is compliant with medications, and is looking forward to continuing care as an outpatient.  The patient has support from his family, who are also protective factors for him.  He was able to safety plan appropriately.  The patient’s risk cannot be further decreased with continued inpatient hospitalization.  He is no longer an acute danger to himself or others, and is stable for discharge home.

## 2022-10-13 NOTE — BH INPATIENT PSYCHIATRY DISCHARGE NOTE - NSDCMRMEDTOKEN_GEN_ALL_CORE_FT
Ativan 1 mg oral tablet: 1 tab(s) orally once a day (at bedtime), As Needed MDD:1 mg per day  OLANZapine 10 mg oral tablet: 1 tab(s) orally once a day (at bedtime)    LORazepam 1 mg oral tablet: 1 tab(s) orally once a day (at bedtime), As needed, insomnia/anxeity MDD:1mg  OLANZapine 10 mg oral tablet: 1 tab(s) orally once a day (at bedtime)

## 2022-10-14 PROCEDURE — 99231 SBSQ HOSP IP/OBS SF/LOW 25: CPT

## 2022-10-14 RX ORDER — OLANZAPINE 15 MG/1
1 TABLET, FILM COATED ORAL
Qty: 15 | Refills: 0
Start: 2022-10-14 | End: 2022-10-28

## 2022-10-14 RX ADMIN — Medication 1 PATCH: at 08:20

## 2022-10-14 RX ADMIN — Medication 4 MILLIGRAM(S): at 16:49

## 2022-10-14 RX ADMIN — Medication 4 MILLIGRAM(S): at 08:25

## 2022-10-14 RX ADMIN — Medication 1 PATCH: at 19:00

## 2022-10-14 RX ADMIN — Medication 4 MILLIGRAM(S): at 12:50

## 2022-10-14 RX ADMIN — Medication 10 MILLIGRAM(S): at 08:24

## 2022-10-14 RX ADMIN — Medication 1 PATCH: at 08:19

## 2022-10-14 RX ADMIN — Medication 4 MILLIGRAM(S): at 20:03

## 2022-10-14 RX ADMIN — OLANZAPINE 10 MILLIGRAM(S): 15 TABLET, FILM COATED ORAL at 20:03

## 2022-10-14 RX ADMIN — Medication 1 PATCH: at 08:21

## 2022-10-14 NOTE — BH INPATIENT PSYCHIATRY PROGRESS NOTE - NSTREATMENTCERT_PSY_ALL_CORE
[place] Units: 0 Price (Use Numbers Only, No Special Characters Or $): 200 Detail Level: Detailed Dilution (U/0.1 Cc): 4 Consent: Written consent obtained. Risks include but not limited to lid/brow ptosis, bruising, swelling, diplopia, temporary effect, incomplete chemical denervation. Periorbital Skin Units/Cc: 40 Document As Units Or Cc?: units Post-Care Instructions: Patient instructed to not lie down for 4 hours and limit physical activity for 24 hours. . Quantity Per Injection Site (Units Or Cc): 2.5 U Including Pricing Information In The Note: No

## 2022-10-14 NOTE — BH DISCHARGE NOTE NURSING/SOCIAL WORK/PSYCH REHAB - NSDCPRGOAL_PSY_ALL_CORE
Writer met with patient to discuss patient’s discharge and progress towards rehabilitation goals. Patient was pleasant and cooperative to meet with writer. Patient was able to engage in safety planning and was given a survey prior to discharge. Patient was admitted to [facility] on 10/06/2022 due to paranoia and AH. During current hospitalization, patient demonstrated fair improvements in symptoms. Patient stated his mind feels quieter and denied any auditory hallucinations. Patient endorsed fair sleep and appetite. Patient was adherent to medication and reported no side effects. Patient denied suicidal/homicidal ideation. Patient expressed looking forward to discharge and returning back to work. Patient has met his psychiatric rehabilitation goal of identifying coping skills to mitigate hallucinations. Patient identified taking a walk and distraction techniques such as playing video games or reading. Patient also emphasized the importance of taking his medication to manage symptoms. Due to the COVID-19 pandemic, unit structure and activities are re-evaluated on a consistent basis in effort to maintain the safety of patients and staff. Patient increasingly attended psychiatric rehabilitation groups and was engaged appropriately in sessions. Patient was visible on the unit and maintained fair behavioral control. Patient was social with select peers and able to make needs known to staff.

## 2022-10-14 NOTE — BH DISCHARGE NOTE NURSING/SOCIAL WORK/PSYCH REHAB - NSDCPRRECOMMEND_PSY_ALL_CORE
yes Psychiatric Rehabilitation staff recommends that patient begins treatment with [facility] for ongoing medication management, support, and psychotherapy. In addition, psych rehab recommends patient continue to explore and utilize coping skills for improved symptom management and sustained recovery.

## 2022-10-14 NOTE — BH DISCHARGE NOTE NURSING/SOCIAL WORK/PSYCH REHAB - NSBHDCADDR1FT_A_CORE
"  2019      RE: Jeramie Wright  [address] [facility] [address]       HPI      ROS      Physical Exam    Patient's family is here to understand the risk benefits and alternatives of liver transplantation.    Briefly: Child is about 2 months old.  Child has cirrhosis of the liver due to unclear etiology.  He initially decompensated with ascites.  A intrahepatic shunt has been placed.  After the placement of the stent, the ascites is improved.  His bilirubin which was around 12 also has improved down to around 5.  His P ELD score has also decreased.  Child is receiving nutrition through a feeding tube.  Initially he drained about 30 g.  After that the growth and weight gain has been plateaued.  No fevers.  Child otherwise is alert.    Vital signs:      BP: (!) 84/61 Pulse: 145           Height: 50 cm (1' 7.69\") Weight: 3.35 kg (7 lb 6.2 oz)  Estimated body mass index is 13.4 kg/m  as calculated from the following:    Height as of this encounter: 0.5 m (1' 7.69\").    Weight as of this encounter: 3.35 kg (7 lb 6.2 oz).    Examination abdomen: The midline scar is healed well.  Could not appreciate any ascites.  Did not palpate the spleen or the liver.      Clinical impression:  Cirrhosis of the liver due to unclear etiology.  Some improvement with the placement of a TI PS shunt.  Child clearly needs nutrition, I encouraged her to talk to Dr. Haydee Lamas our pediatric hepatologist about consideration of TPN supplementation.    We discussed about the risk benefits and alternatives of liver transplantation.  I told her that at this weight of 3 kg he would be a very high risk for liver transplantation, particularly we would be concerned about vascular thrombosis.  Since the child is somewhat stable, I encouraged her to seek nutritional support and see if the child grows.  I told her that I would like to see the patient every month so that we can monitor the progress.    Overall patient's mother understands the " above clinical details and all questions were answered.    Total time spent direct face-to-face counselin minutes total time 30 minutes.      Richard Johnson MD   remote appt. Please check email for ZOOM ID and intake information.  Please be available at this time for phone call

## 2022-10-14 NOTE — BH DISCHARGE NOTE NURSING/SOCIAL WORK/PSYCH REHAB - NSCDUDCCRISIS_PSY_A_CORE
[facility]  1 [phone] [facility]-WELL ([phone])  Text "WELL" to [phone]  Website: [email or web address] [phone]) Website: [email or web address]/.  [facility]  1 ([phone]) LIFENET ([phone])/.  [facility]  [address] [place] [id number] [phone]   Hours:  Monday through Friday from 9 AM to 3 PM/[facility] Lif[facility]

## 2022-10-14 NOTE — BH DISCHARGE NOTE NURSING/SOCIAL WORK/PSYCH REHAB - PATIENT PORTAL LINK FT
You can access the FollowMyHealth Patient Portal offered by [facility] by registering at the following website: [email or web address]. By joining [facility]’s FollowMyHealth portal, you will also be able to view your health information using other applications (apps) compatible with our system.

## 2022-10-14 NOTE — BH INPATIENT PSYCHIATRY PROGRESS NOTE - NSBHASSESSSUMMFT_PSY_ALL_CORE
Patient is a year 36 old, male; domicile with brother in [place]; single; noncaregiver; unemployed ; no formal psychiatric treatment; no known suicide attempts; no known history of violence or arrests; h/o cannabis abuse, no known history of complicated withdrawal; PMH unremarkable; brought in by cousin for bizarre behavior.    Diagnosis   Unspecified Psychosis  R/O schizophreniform Disorder  R/O Brief Psychotic Reaction      10/13 update  Continued improvement overall with no further AH and denies command AH again today.  Mood is good.  Disorganization improving.  Behavior well controlled, tolerating medications well.    PLAN:  Patient requires acute care  Admitted on a 939 emergency status   Olanzapine 10 mg HS (choice over aripiprazole and risperidone based on family history of response)  change clonazepam 1 mg HS to lorazepam 1 mg HS PRN

## 2022-10-14 NOTE — BH INPATIENT PSYCHIATRY PROGRESS NOTE - NSBHFUPINTERVALHXFT_PSY_A_CORE
Patient seen for follow up of psychosis.  Chart reviewed and case discussed with treatment team.  No events reported overnight.  The patient states he continues to do well, continues to experience improvement in symptoms.  He states his AHs are much improved - perhaps just some static in the background, but they are no longer concerning for him.  He also denies any delusions, and feels his thoughts are much more clear.  He has been able to read on the unit.  He has been compliant with medications, denies side effects.  No Klonopin or Ativan needed yesterday.

## 2022-10-14 NOTE — BH INPATIENT PSYCHIATRY PROGRESS NOTE - CURRENT MEDICATION
MEDICATIONS  (STANDING):  nicotine - 21 mG/24Hr(s) Patch 1 Patch Transdermal daily  OLANZapine Disintegrating Tablet 10 milliGRAM(s) Oral at bedtime    MEDICATIONS  (PRN):  acetaminophen     Tablet .. 650 milliGRAM(s) Oral every 6 hours PRN Temp greater or equal to 38.5C (101.3F), Mild Pain (1 - 3), Moderate Pain (4 - 6)  benzocaine 15 mG/menthol 3.6 mG Lozenge 1 Lozenge Oral every 4 hours PRN sore throat  bisacodyl 10 milliGRAM(s) Oral every 12 hours PRN Constipation  LORazepam     Tablet 1 milliGRAM(s) Oral at bedtime PRN insomnia/anxeity  LORazepam     Tablet 2 milliGRAM(s) Oral every 4 hours PRN agitation  nicotine  Polacrilex Gum 4 milliGRAM(s) Oral every 2 hours PRN cravings  OLANZapine 7.5 milliGRAM(s) Oral every 4 hours PRN agitation  OLANZapine Injectable 10 milliGRAM(s) IntraMuscular once PRN severe agitation  polyethylene glycol 3350 17 Gram(s) Oral daily PRN constipation

## 2022-10-14 NOTE — BH INPATIENT PSYCHIATRY PROGRESS NOTE - MSE UNSTRUCTURED FT
On exam today the patient is calm and is cooperative.    Speech is clear and of normal rate.    Thought process: linear and goal directed.    Thought content: with no evidence of false beliefs or obsessions.   Perception: Denies hearing command AH, some static in the background.   Mood: Describes as "okay".   Affect: constricted, but more reactive, stable, appropriate.    Patient denies active suicidal ideation, intention and plan.    Patient denies active aggressive/homicidal ideation, intent or plan.   Patient is Alert and oriented .   Fund of knowledge is fair. Memory is intact  Insight and judgment are fair.   Impulse control is intact at this time.

## 2022-10-15 RX ADMIN — OLANZAPINE 10 MILLIGRAM(S): 15 TABLET, FILM COATED ORAL at 19:55

## 2022-10-15 RX ADMIN — Medication 4 MILLIGRAM(S): at 07:58

## 2022-10-15 RX ADMIN — Medication 4 MILLIGRAM(S): at 13:39

## 2022-10-15 RX ADMIN — Medication 4 MILLIGRAM(S): at 20:46

## 2022-10-15 RX ADMIN — Medication 1 PATCH: at 10:14

## 2022-10-15 RX ADMIN — Medication 4 MILLIGRAM(S): at 17:57

## 2022-10-15 RX ADMIN — Medication 4 MILLIGRAM(S): at 10:21

## 2022-10-15 RX ADMIN — Medication 10 MILLIGRAM(S): at 07:58

## 2022-10-16 RX ADMIN — Medication 4 MILLIGRAM(S): at 20:31

## 2022-10-16 RX ADMIN — Medication 1 PATCH: at 17:37

## 2022-10-16 RX ADMIN — Medication 1 PATCH: at 08:15

## 2022-10-16 RX ADMIN — Medication 4 MILLIGRAM(S): at 09:16

## 2022-10-16 RX ADMIN — Medication 1 PATCH: at 07:44

## 2022-10-16 RX ADMIN — Medication 4 MILLIGRAM(S): at 16:34

## 2022-10-16 RX ADMIN — OLANZAPINE 10 MILLIGRAM(S): 15 TABLET, FILM COATED ORAL at 20:31

## 2022-10-16 RX ADMIN — Medication 1 PATCH: at 08:19

## 2022-10-17 VITALS — TEMPERATURE: 98 F | RESPIRATION RATE: 17 BRPM | OXYGEN SATURATION: 99 %

## 2022-10-17 RX ADMIN — Medication 1 PATCH: at 07:17

## 2022-10-17 RX ADMIN — Medication 4 MILLIGRAM(S): at 09:34

## 2022-10-17 RX ADMIN — Medication 1 PATCH: at 08:00

## 2022-10-17 RX ADMIN — Medication 1 PATCH: at 08:05

## 2022-10-17 NOTE — BH INPATIENT PSYCHIATRY PROGRESS NOTE - NSBHCHARTREVIEWVS_PSY_A_CORE FT
Vital Signs Last 24 Hrs  T(C): 36.4 (10-09-22 @ 08:13), Max: 36.6 (10-08-22 @ 17:47)  T(F): 97.6 (10-09-22 @ 08:13), Max: 97.9 (10-08-22 @ 17:47)  HR: --  BP: --  BP(mean): --  RR: --  SpO2: --    Orthostatic VS  10-09-22 @ 08:13  Lying BP: --/-- HR: --  Sitting BP: 112/65 HR: 75  Standing BP: 105/74 HR: 81  Site: --  Mode: --  Orthostatic VS  10-08-22 @ 19:22  Lying BP: --/-- HR: --  Sitting BP: 122/68 HR: 68  Standing BP: 124/78 HR: 92  Site: --  Mode: --  Orthostatic VS  10-08-22 @ 08:22  Lying BP: --/-- HR: --  Sitting BP: 114/62 HR: 80  Standing BP: 120/66 HR: 79  Site: --  Mode: --  Orthostatic VS  10-07-22 @ 19:53  Lying BP: --/-- HR: --  Sitting BP: 99/64 HR: 66  Standing BP: 106/66 HR: 88  Site: --  Mode: --  
Vital Signs Last 24 Hrs  T(C): 35.9 (10-12-22 @ 07:44), Max: 36.2 (10-11-22 @ 19:50)  T(F): 96.7 (10-12-22 @ 07:44), Max: 97.2 (10-11-22 @ 19:50)  HR: 79 (10-12-22 @ 07:44) (79 - 79)  BP: 111/68 (10-12-22 @ 07:44) (111/68 - 111/68)  BP(mean): --  RR: --  SpO2: --    
Vital Signs Last 24 Hrs  T(C): 36.4 (10-11-22 @ 08:13), Max: 36.8 (10-10-22 @ 17:47)  T(F): 97.5 (10-11-22 @ 08:13), Max: 98.2 (10-10-22 @ 17:47)  HR: 59 (10-11-22 @ 08:13) (59 - 59)  BP: 107/68 (10-11-22 @ 08:13) (107/68 - 107/68)  BP(mean): --  RR: --  SpO2: --    Orthostatic VS  10-09-22 @ 20:44  Lying BP: --/-- HR: --  Sitting BP: 133/71 HR: 69  Standing BP: 128/81 HR: 87  Site: --  Mode: --  
Vital Signs Last 24 Hrs  T(C): 36.8 (10-17-22 @ 07:41), Max: 36.8 (10-17-22 @ 07:41)  T(F): 98.2 (10-17-22 @ 07:41), Max: 98.2 (10-17-22 @ 07:41)  HR: --  BP: --  BP(mean): --  RR: 17 (10-17-22 @ 07:41) (17 - 17)  SpO2: 99% (10-17-22 @ 07:41) (99% - 99%)    Orthostatic VS  10-17-22 @ 07:41  Lying BP: --/-- HR: --  Sitting BP: 117/78 HR: 89  Standing BP: 105/66 HR: 97  Site: --  Mode: --  Orthostatic VS  10-16-22 @ 05:30  Lying BP: --/-- HR: --  Sitting BP: 114/67 HR: 77  Standing BP: 101/68 HR: 77  Site: --  Mode: --  
Vital Signs Last 24 Hrs  T(C): 36.2 (10-08-22 @ 08:22), Max: 36.4 (10-07-22 @ 17:31)  T(F): 97.2 (10-08-22 @ 08:22), Max: 97.5 (10-07-22 @ 17:31)  HR: --  BP: --  BP(mean): --  RR: --  SpO2: --    Orthostatic VS  10-08-22 @ 08:22  Lying BP: --/-- HR: --  Sitting BP: 114/62 HR: 80  Standing BP: 120/66 HR: 79  Site: --  Mode: --  Orthostatic VS  10-07-22 @ 19:53  Lying BP: --/-- HR: --  Sitting BP: 99/64 HR: 66  Standing BP: 106/66 HR: 88  Site: --  Mode: --  Orthostatic VS  10-07-22 @ 08:53  Lying BP: 103/77 HR: 80  Sitting BP: 118/78 HR: 80  Standing BP: --/-- HR: --  Site: --  Mode: --  
Vital Signs Last 24 Hrs  T(C): 36.1 (10-13-22 @ 08:42), Max: 36.8 (10-12-22 @ 17:13)  T(F): 96.9 (10-13-22 @ 08:42), Max: 98.2 (10-12-22 @ 17:13)  HR: 61 (10-13-22 @ 08:42) (61 - 61)  BP: 112/67 (10-13-22 @ 08:42) (112/67 - 112/67)  BP(mean): --  RR: --  SpO2: --    
Vital Signs Last 24 Hrs  T(C): 37 (10-14-22 @ 06:48), Max: 37.8 (10-13-22 @ 16:40)  T(F): 98.6 (10-14-22 @ 06:48), Max: 100.1 (10-13-22 @ 16:40)  HR: 64 (10-14-22 @ 08:39) (64 - 64)  BP: 99/54 (10-14-22 @ 08:39) (99/54 - 99/54)  BP(mean): --  RR: --  SpO2: --    
Vital Signs Last 24 Hrs  T(C): 36.5 (10-10-22 @ 08:21), Max: 36.5 (10-09-22 @ 17:40)  T(F): 97.7 (10-10-22 @ 08:21), Max: 97.7 (10-09-22 @ 17:40)  HR: 57 (10-10-22 @ 08:21) (57 - 57)  BP: 108/74 (10-10-22 @ 08:21) (108/74 - 108/74)  BP(mean): --  RR: --  SpO2: --    Orthostatic VS  10-09-22 @ 20:44  Lying BP: --/-- HR: --  Sitting BP: 133/71 HR: 69  Standing BP: 128/81 HR: 87  Site: --  Mode: --  Orthostatic VS  10-09-22 @ 08:13  Lying BP: --/-- HR: --  Sitting BP: 112/65 HR: 75  Standing BP: 105/74 HR: 81  Site: --  Mode: --  Orthostatic VS  10-08-22 @ 19:22  Lying BP: --/-- HR: --  Sitting BP: 122/68 HR: 68  Standing BP: 124/78 HR: 92  Site: --  Mode: --  
Vital Signs Last 24 Hrs  T(C): --  T(F): --  HR: --  BP: --  BP(mean): --  RR: --  SpO2: --

## 2022-10-17 NOTE — BH INPATIENT PSYCHIATRY PROGRESS NOTE - NSTXPSYCHOINTERMD_PSY_ALL_CORE
SGA Trial and supportive therapy

## 2022-10-17 NOTE — BH INPATIENT PSYCHIATRY PROGRESS NOTE - NSCGIIMPROVESX_PSY_ALL_CORE
3 = Minimally improved - slightly better with little or no clinically meaningful reduction of symptoms.  Represents very little change in basic clinical status, level of care, or functional capacity.

## 2022-10-17 NOTE — BH INPATIENT PSYCHIATRY PROGRESS NOTE - NSTXDCOPLKINTERMD_PSY_ALL_CORE
Linkage, psychoed

## 2022-10-17 NOTE — BH INPATIENT PSYCHIATRY PROGRESS NOTE - PRN MEDS
MEDICATIONS  (PRN):  
MEDICATIONS  (PRN):  LORazepam     Tablet 2 milliGRAM(s) Oral every 4 hours PRN agitation  LORazepam   Injectable 2 milliGRAM(s) IntraMuscular Once PRN Agitation  nicotine  Polacrilex Gum 4 milliGRAM(s) Oral every 2 hours PRN cravings  OLANZapine 7.5 milliGRAM(s) Oral every 4 hours PRN agitation  OLANZapine Injectable 10 milliGRAM(s) IntraMuscular once PRN severe agitation  polyethylene glycol 3350 17 Gram(s) Oral daily PRN constipation  
MEDICATIONS  (PRN):  acetaminophen     Tablet .. 650 milliGRAM(s) Oral every 6 hours PRN Temp greater or equal to 38.5C (101.3F), Mild Pain (1 - 3), Moderate Pain (4 - 6)  benzocaine 15 mG/menthol 3.6 mG Lozenge 1 Lozenge Oral every 4 hours PRN sore throat  bisacodyl 10 milliGRAM(s) Oral every 12 hours PRN Constipation  LORazepam     Tablet 1 milliGRAM(s) Oral at bedtime PRN insomnia/anxeity  LORazepam     Tablet 2 milliGRAM(s) Oral every 4 hours PRN agitation  nicotine  Polacrilex Gum 4 milliGRAM(s) Oral every 2 hours PRN cravings  OLANZapine 7.5 milliGRAM(s) Oral every 4 hours PRN agitation  OLANZapine Injectable 10 milliGRAM(s) IntraMuscular once PRN severe agitation  polyethylene glycol 3350 17 Gram(s) Oral daily PRN constipation  
MEDICATIONS  (PRN):  LORazepam     Tablet 2 milliGRAM(s) Oral every 4 hours PRN agitation  LORazepam   Injectable 2 milliGRAM(s) IntraMuscular Once PRN Agitation  nicotine  Polacrilex Gum 4 milliGRAM(s) Oral every 2 hours PRN cravings  OLANZapine 7.5 milliGRAM(s) Oral every 4 hours PRN agitation  OLANZapine Injectable 10 milliGRAM(s) IntraMuscular once PRN severe agitation  
MEDICATIONS  (PRN):  bisacodyl 5 milliGRAM(s) Oral every 12 hours PRN Constipation  LORazepam     Tablet 2 milliGRAM(s) Oral every 4 hours PRN agitation  LORazepam   Injectable 2 milliGRAM(s) IntraMuscular Once PRN Agitation  nicotine  Polacrilex Gum 4 milliGRAM(s) Oral every 2 hours PRN cravings  OLANZapine 7.5 milliGRAM(s) Oral every 4 hours PRN agitation  OLANZapine Injectable 10 milliGRAM(s) IntraMuscular once PRN severe agitation  polyethylene glycol 3350 17 Gram(s) Oral daily PRN constipation  
MEDICATIONS  (PRN):  acetaminophen     Tablet .. 650 milliGRAM(s) Oral every 6 hours PRN Temp greater or equal to 38.5C (101.3F), Mild Pain (1 - 3), Moderate Pain (4 - 6)  benzocaine 15 mG/menthol 3.6 mG Lozenge 1 Lozenge Oral every 4 hours PRN sore throat  bisacodyl 10 milliGRAM(s) Oral every 12 hours PRN Constipation  LORazepam     Tablet 1 milliGRAM(s) Oral at bedtime PRN insomnia/anxeity  LORazepam     Tablet 2 milliGRAM(s) Oral every 4 hours PRN agitation  nicotine  Polacrilex Gum 4 milliGRAM(s) Oral every 2 hours PRN cravings  OLANZapine 7.5 milliGRAM(s) Oral every 4 hours PRN agitation  OLANZapine Injectable 10 milliGRAM(s) IntraMuscular once PRN severe agitation  polyethylene glycol 3350 17 Gram(s) Oral daily PRN constipation  
MEDICATIONS  (PRN):  bisacodyl 10 milliGRAM(s) Oral every 12 hours PRN Constipation  LORazepam     Tablet 1 milliGRAM(s) Oral at bedtime PRN insomnia/anxeity  LORazepam     Tablet 2 milliGRAM(s) Oral every 4 hours PRN agitation  LORazepam   Injectable 2 milliGRAM(s) IntraMuscular Once PRN Agitation  nicotine  Polacrilex Gum 4 milliGRAM(s) Oral every 2 hours PRN cravings  OLANZapine 7.5 milliGRAM(s) Oral every 4 hours PRN agitation  OLANZapine Injectable 10 milliGRAM(s) IntraMuscular once PRN severe agitation  polyethylene glycol 3350 17 Gram(s) Oral daily PRN constipation  
MEDICATIONS  (PRN):  LORazepam     Tablet 2 milliGRAM(s) Oral every 4 hours PRN agitation  LORazepam   Injectable 2 milliGRAM(s) IntraMuscular Once PRN Agitation  nicotine  Polacrilex Gum 4 milliGRAM(s) Oral every 2 hours PRN cravings  OLANZapine 7.5 milliGRAM(s) Oral every 4 hours PRN agitation  OLANZapine Injectable 10 milliGRAM(s) IntraMuscular once PRN severe agitation  polyethylene glycol 3350 17 Gram(s) Oral daily PRN constipation  
MEDICATIONS  (PRN):  LORazepam     Tablet 2 milliGRAM(s) Oral every 4 hours PRN agitation  LORazepam   Injectable 2 milliGRAM(s) IntraMuscular Once PRN Agitation  nicotine  Polacrilex Gum 4 milliGRAM(s) Oral every 2 hours PRN cravings  OLANZapine 7.5 milliGRAM(s) Oral every 4 hours PRN agitation  OLANZapine Injectable 10 milliGRAM(s) IntraMuscular once PRN severe agitation  polyethylene glycol 3350 17 Gram(s) Oral daily PRN constipation

## 2022-10-17 NOTE — BH INPATIENT PSYCHIATRY PROGRESS NOTE - NSBHASSESSSUMMFT_PSY_ALL_CORE
Patient is a year 36 old, male; domicile with brother in [place]; single; noncaregiver; unemployed ; no formal psychiatric treatment; no known suicide attempts; no known history of violence or arrests; h/o cannabis abuse, no known history of complicated withdrawal; PMH unremarkable; brought in by cousin for bizarre behavior.    Diagnosis   Unspecified Psychosis  R/O schizophreniform Disorder  R/O Brief Psychotic Reaction      10/13 update  Continued improvement overall with no further AH and denies command AH again today.  Mood is good.  Disorganization improving.  Behavior well controlled, tolerating medications well.    PLAN:  Patient requires acute care  Admitted on a 939 emergency status   Olanzapine 10 mg HS (choice over aripiprazole and risperidone based on family history of response)  change clonazepam 1 mg HS to lorazepam 1 mg HS PRN  Patient is a year 36 old, male; domicile with brother in [place]; single; noncaregiver; unemployed ; no formal psychiatric treatment; no known suicide attempts; no known history of violence or arrests; h/o cannabis abuse, no known history of complicated withdrawal; PMH unremarkable; brought in by cousin for bizarre behavior.    Diagnosis   Unspecified Psychosis  R/O schizophreniform Disorder  R/O Brief Psychotic Reaction     The patient has continued to show improvement in symptoms.  He states AHs are much improved, almost completely gone.  He denies any delusions.  He denies any depression, anxiety, or SI, and his behavior has been well controlled.  The patient has been sleeping well.  The patient has been engaged in treatment on the unit, is compliant with medications, and is looking forward to continuing care as an outpatient.  The patient has support from his family, who are also protective factors for him.  He was able to safety plan appropriately.  The patient’s risk cannot be further decreased with continued inpatient hospitalization.  He is no longer an acute danger to himself or others, and is stable for discharge home.    PLAN:  Discharge home today  Admitted on a 939 emergency status   Olanzapine 10 mg HS (choice over aripiprazole and risperidone based on family history of response)  Lorazepam 1 mg HS PRN

## 2022-10-17 NOTE — BH INPATIENT PSYCHIATRY PROGRESS NOTE - NSCGISEVERILLNESS_PSY_ALL_CORE
5 = Markedly ill - intrusive symptoms that distinctly impair social/occupational function or cause intrusive levels of distress
6 = Severely ill - disruptive pathology, behavior and function are frequently influenced by symptoms, may require assistance from others
5 = Markedly ill - intrusive symptoms that distinctly impair social/occupational function or cause intrusive levels of distress
6 = Severely ill - disruptive pathology, behavior and function are frequently influenced by symptoms, may require assistance from others

## 2022-10-17 NOTE — BH INPATIENT PSYCHIATRY PROGRESS NOTE - NSTXDCOPLKDATETRGT_PSY_ALL_CORE
14-Oct-2022

## 2022-10-17 NOTE — BH INPATIENT PSYCHIATRY PROGRESS NOTE - NSBHMETABOLIC_PSY_ALL_CORE_FT
BMI: BMI (kg/m2): 21.8 (10-07-22 @ 17:31)  HbA1c: A1C with Estimated Average Glucose Result: 5.1 % (10-07-22 @ 09:15)    Glucose:   BP: 100/69 (10-06-22 @ 12:31) (100/60 - 121/61)  Lipid Panel: Date/Time: 10-07-22 @ 09:15  Cholesterol, Serum: 122  Direct LDL: --  HDL Cholesterol, Serum: 57  Total Cholesterol/HDL Ration Measurement: --  Triglycerides, Serum: 69  
BMI: BMI (kg/m2): 21.8 (10-07-22 @ 17:31)  HbA1c: A1C with Estimated Average Glucose Result: 5.1 % (10-07-22 @ 09:15)    Glucose:   BP: 112/67 (10-13-22 @ 08:42) (107/68 - 112/67)  Lipid Panel: Date/Time: 10-07-22 @ 09:15  Cholesterol, Serum: 122  Direct LDL: --  HDL Cholesterol, Serum: 57  Total Cholesterol/HDL Ration Measurement: --  Triglycerides, Serum: 69  
BMI: BMI (kg/m2): 21.8 (10-07-22 @ 17:31)  HbA1c: A1C with Estimated Average Glucose Result: 5.1 % (10-07-22 @ 09:15)    Glucose:   BP: 111/68 (10-12-22 @ 07:44) (107/68 - 111/68)  Lipid Panel: Date/Time: 10-07-22 @ 09:15  Cholesterol, Serum: 122  Direct LDL: --  HDL Cholesterol, Serum: 57  Total Cholesterol/HDL Ration Measurement: --  Triglycerides, Serum: 69  
BMI: BMI (kg/m2): 21.8 (10-07-22 @ 17:31)  HbA1c: A1C with Estimated Average Glucose Result: 5.1 % (10-07-22 @ 09:15)    Glucose:   BP: 99/54 (10-14-22 @ 08:39) (99/54 - 112/67)  Lipid Panel: Date/Time: 10-07-22 @ 09:15  Cholesterol, Serum: 122  Direct LDL: --  HDL Cholesterol, Serum: 57  Total Cholesterol/HDL Ration Measurement: --  Triglycerides, Serum: 69  
BMI: BMI (kg/m2): 21.8 (10-07-22 @ 17:31)  HbA1c: A1C with Estimated Average Glucose Result: 5.1 % (10-07-22 @ 09:15)    Glucose:   BP: 100/69 (10-06-22 @ 12:31) (100/69 - 120/74)  Lipid Panel: Date/Time: 10-07-22 @ 09:15  Cholesterol, Serum: 122  Direct LDL: --  HDL Cholesterol, Serum: 57  Total Cholesterol/HDL Ration Measurement: --  Triglycerides, Serum: 69  
BMI: BMI (kg/m2): 21.8 (10-07-22 @ 17:31)  HbA1c: A1C with Estimated Average Glucose Result: 5.1 % (10-07-22 @ 09:15)    Glucose:   BP: 108/74 (10-10-22 @ 08:21) (108/74 - 108/74)  Lipid Panel: Date/Time: 10-07-22 @ 09:15  Cholesterol, Serum: 122  Direct LDL: --  HDL Cholesterol, Serum: 57  Total Cholesterol/HDL Ration Measurement: --  Triglycerides, Serum: 69  
BMI:   HbA1c:   Glucose:   BP: --  Lipid Panel: 
BMI: BMI (kg/m2): 21.8 (10-07-22 @ 17:31)  HbA1c: A1C with Estimated Average Glucose Result: 5.1 % (10-07-22 @ 09:15)    Glucose:   BP: 107/68 (10-11-22 @ 08:13) (107/68 - 108/74)  Lipid Panel: Date/Time: 10-07-22 @ 09:15  Cholesterol, Serum: 122  Direct LDL: --  HDL Cholesterol, Serum: 57  Total Cholesterol/HDL Ration Measurement: --  Triglycerides, Serum: 69  
BMI: BMI (kg/m2): 21.8 (10-15-22 @ 15:35)  HbA1c: A1C with Estimated Average Glucose Result: 5.1 % (10-07-22 @ 09:15)    Glucose:   BP: --  Lipid Panel: Date/Time: 10-07-22 @ 09:15  Cholesterol, Serum: 122  Direct LDL: --  HDL Cholesterol, Serum: 57  Total Cholesterol/HDL Ration Measurement: --  Triglycerides, Serum: 69

## 2022-10-17 NOTE — BH INPATIENT PSYCHIATRY PROGRESS NOTE - NSTXPSYCHOGOAL_PSY_ALL_CORE
Will identify 2 coping skills that help mitigate hallucinations
Will be able to report experiencing hallucinations to staff
Will identify 2 coping skills that help mitigate hallucinations

## 2022-10-17 NOTE — BH INPATIENT PSYCHIATRY PROGRESS NOTE - MSE UNSTRUCTURED FT
On exam today the patient is calm and is cooperative.    Speech is clear and of normal rate.    Thought process: linear and goal directed.    Thought content: with no evidence of false beliefs or obsessions.   Perception: Denies hearing command AH, some static in the background.   Mood: Describes as "okay".   Affect: constricted, but more reactive, stable, appropriate.    Patient denies active suicidal ideation, intention and plan.    Patient denies active aggressive/homicidal ideation, intent or plan.   Patient is Alert and oriented .   Fund of knowledge is fair. Memory is intact  Insight and judgment are fair.   Impulse control is intact at this time.       On exam today the patient is calm and is cooperative.    Speech is clear and of normal rate.    Thought process: linear and goal directed.    Thought content: with no evidence of false beliefs or obsessions.   Perception: Denies hearing command AH, some static in the background.   Mood: Describes as "good".   Affect: constricted, but more reactive, stable, appropriate.    Patient denies active suicidal ideation, intention and plan.    Patient denies active aggressive/homicidal ideation, intent or plan.   Patient is Alert and oriented .   Fund of knowledge is fair. Memory is intact  Insight and judgment are fair.   Impulse control is intact at this time.

## 2022-10-17 NOTE — BH INPATIENT PSYCHIATRY PROGRESS NOTE - NSDCCRITERIA_PSY_ALL_CORE
Patient will be less psychotic and stable for discharge with a CGI Improvement score = 2

## 2022-10-17 NOTE — BH INPATIENT PSYCHIATRY PROGRESS NOTE - NSBHCHARTREVIEWINVESTIGATE_PSY_A_CORE FT
EKG: Rate 50, sinus erik, with incomplete RBBB most apparent in V1 (QRS duration 106), QTC: 461  Head CT done for FEP w/u was normal
EKG: Rate 50, sinus erki, with incomplete RBBB most apparent in V1 (QRS duration 106), QTC: 461  Head CT done for FEP w/u was normal
EKG: Rate 50, sinus erik, with incomplete RBBB most apparent in V1 (QRS duration 106), QTC: 461  Head CT done for FEP w/u was normal

## 2022-10-17 NOTE — BH INPATIENT PSYCHIATRY PROGRESS NOTE - NSICDXBHTERTIARYDX_PSY_ALL_CORE
R/O Schizophreniform disorder   F20.81  R/O Substance-induced psychotic disorder   F19.952  
R/O Schizophreniform disorder   F20.81  R/O Substance-induced psychotic disorder   F19.955  
R/O Brief psychotic disorder   F23  R/O Schizophreniform disorder   F20.81  
R/O Schizophreniform disorder   F20.81  R/O Substance-induced psychotic disorder   F19.957  
R/O Schizophreniform disorder   F20.81  R/O Substance-induced psychotic disorder   F19.956  
R/O Brief psychotic disorder   F23  R/O Schizophreniform disorder   F20.81  
R/O Schizophreniform disorder   F20.81  R/O Substance-induced psychotic disorder   F19.956  
R/O Brief psychotic disorder   F23  R/O Schizophreniform disorder   F20.81  
R/O Brief psychotic disorder   F23  R/O Schizophreniform disorder   F20.81

## 2022-10-17 NOTE — BH INPATIENT PSYCHIATRY PROGRESS NOTE - NSTXDCOPLKDATEEST_PSY_ALL_CORE
07-Oct-2022

## 2022-10-17 NOTE — BH INPATIENT PSYCHIATRY PROGRESS NOTE - NSTXPSYCHODATEEST_PSY_ALL_CORE
07-Oct-2022
11-Oct-2022
07-Oct-2022

## 2022-10-17 NOTE — BH INPATIENT PSYCHIATRY PROGRESS NOTE - NSBHATTESTTYPEVISIT_PSY_A_CORE
Attending Only
Attending Only
JULISSA without on-site Attending supervision
JULISSA without on-site Attending supervision
Attending Only
Attending with Resident/Fellow/Student

## 2022-10-17 NOTE — BH INPATIENT PSYCHIATRY PROGRESS NOTE - NSBHCHARTREVIEWLAB_PSY_A_CORE FT
10/7: cbc, CMP, TSH, Lipids, A1c all normal, CK slightly elevated. No utox obtained, serum tox negative, COVID negative

## 2022-10-17 NOTE — BH INPATIENT PSYCHIATRY PROGRESS NOTE - NSTXPSYCHODATETRGT_PSY_ALL_CORE
20-Oct-2022
20-Oct-2022
14-Oct-2022
14-Oct-2022
17-Oct-2022
18-Oct-2022
14-Oct-2022

## 2022-10-31 ENCOUNTER — OUTPATIENT (OUTPATIENT)
Dept: OUTPATIENT SERVICES | Facility: HOSPITAL | Age: 37
LOS: 1 days | Discharge: HOME | End: 2022-10-31

## 2022-11-18 DIAGNOSIS — F20.81 SCHIZOPHRENIFORM DISORDER: ICD-10-CM

## 2022-11-18 DIAGNOSIS — F20.9 SCHIZOPHRENIA, UNSPECIFIED: ICD-10-CM

## 2022-11-18 DIAGNOSIS — F23 BRIEF PSYCHOTIC DISORDER: ICD-10-CM

## 2023-07-14 NOTE — BH PATIENT PROFILE - FUNCTIONAL ASSESSMENT - DAILY ACTIVITY 1.
4 = No assist / stand by assistance Pt coming from [facility] c/o chest pain beginning this morning, reports he did not take his methadone this morning. Currently denies CP. Pt coming from [facility] c/o chest pain beginning this morning, reports he did not take his methadone this morning. Reports blood pressure has been fluctuating, denies headache, dizziness. Currently denies CP.
